# Patient Record
Sex: MALE | Race: WHITE
[De-identification: names, ages, dates, MRNs, and addresses within clinical notes are randomized per-mention and may not be internally consistent; named-entity substitution may affect disease eponyms.]

---

## 2020-08-09 ENCOUNTER — HOSPITAL ENCOUNTER (INPATIENT)
Dept: HOSPITAL 47 - EC | Age: 50
LOS: 4 days | Discharge: HOME | DRG: 885 | End: 2020-08-13
Attending: PSYCHIATRY & NEUROLOGY | Admitting: PSYCHIATRY & NEUROLOGY
Payer: COMMERCIAL

## 2020-08-09 DIAGNOSIS — F17.210: ICD-10-CM

## 2020-08-09 DIAGNOSIS — F23: ICD-10-CM

## 2020-08-09 DIAGNOSIS — F19.10: ICD-10-CM

## 2020-08-09 DIAGNOSIS — R45.851: ICD-10-CM

## 2020-08-09 DIAGNOSIS — E66.3: ICD-10-CM

## 2020-08-09 DIAGNOSIS — F31.9: Primary | ICD-10-CM

## 2020-08-09 DIAGNOSIS — G47.00: ICD-10-CM

## 2020-08-09 DIAGNOSIS — F41.9: ICD-10-CM

## 2020-08-09 DIAGNOSIS — Z79.899: ICD-10-CM

## 2020-08-09 DIAGNOSIS — Z65.3: ICD-10-CM

## 2020-08-09 DIAGNOSIS — Z59.9: ICD-10-CM

## 2020-08-09 DIAGNOSIS — F10.239: ICD-10-CM

## 2020-08-09 DIAGNOSIS — Z56.0: ICD-10-CM

## 2020-08-09 DIAGNOSIS — Z81.8: ICD-10-CM

## 2020-08-09 PROCEDURE — 80320 DRUG SCREEN QUANTALCOHOLS: CPT

## 2020-08-09 PROCEDURE — 99285 EMERGENCY DEPT VISIT HI MDM: CPT

## 2020-08-09 PROCEDURE — 80306 DRUG TEST PRSMV INSTRMNT: CPT

## 2020-08-09 PROCEDURE — 84443 ASSAY THYROID STIM HORMONE: CPT

## 2020-08-09 PROCEDURE — 36415 COLL VENOUS BLD VENIPUNCTURE: CPT

## 2020-08-09 PROCEDURE — 83036 HEMOGLOBIN GLYCOSYLATED A1C: CPT

## 2020-08-09 PROCEDURE — 80053 COMPREHEN METABOLIC PANEL: CPT

## 2020-08-09 PROCEDURE — 83520 IMMUNOASSAY QUANT NOS NONAB: CPT

## 2020-08-09 PROCEDURE — 82075 ASSAY OF BREATH ETHANOL: CPT

## 2020-08-09 PROCEDURE — 80329 ANALGESICS NON-OPIOID 1 OR 2: CPT

## 2020-08-09 PROCEDURE — 80061 LIPID PANEL: CPT

## 2020-08-09 PROCEDURE — 85025 COMPLETE CBC W/AUTO DIFF WBC: CPT

## 2020-08-09 SDOH — ECONOMIC STABILITY - INCOME SECURITY: PROBLEM RELATED TO HOUSING AND ECONOMIC CIRCUMSTANCES, UNSPECIFIED: Z59.9

## 2020-08-09 SDOH — ECONOMIC STABILITY - INCOME SECURITY: UNEMPLOYMENT, UNSPECIFIED: Z56.0

## 2020-08-09 NOTE — ED
Psych HPI





- General


Chief Complaint: Psychiatric Symptoms


Stated Complaint: Mental Health


Source: patient


Mode of arrival: ambulatory





- History of Present Illness


Initial Comments: 


Flako a 50-year-old male who reports a history of bipolar.  Patient reports that

due to some changes in his life he no longer has primary care or psychiatric 

care states he ran out of his medications 2 weeks ago and has been unmedicated. 

Patient states that he feels that he is in a manic state.  He states his 

thoughts are racing and he hasn't slept for 6 days.  Patient does admit to 

drinking alcohol in an attempt to self medicate.  Patient states he will 

hospital today because he knows he needs to be back on his medications.








- Related Data


                                    Allergies











Allergy/AdvReac Type Severity Reaction Status Date / Time


 


No Known Allergies Allergy   Verified 08/09/20 21:31














Review of Systems


ROS Statement: 


Those systems with pertinent positive or pertinent negative responses have been 

documented in the HPI.





ROS Other: All systems not noted in ROS Statement are negative.





Past Medical History


Past Medical History: No Reported History


History of Any Multi-Drug Resistant Organisms: None Reported


Past Surgical History: No Surgical Hx Reported


Past Psychological History: Anxiety, Depression


Smoking Status: Current every day smoker


Past Alcohol Use History: Abuse, Daily





General Exam





- General Exam Comments


Initial Comments: 


Physical Exam


GENERAL:


Patient is well-developed and well-nourished.  


Patient is nontoxic and well-hydrated and is in no distress.





HENT:


Normocephalic, Atraumatic. 





EYES:


PERRL, EOMI





PULMONARY:


Unlabored respirations.  





CARDIOVASCULAR:


RRR


Warm and well perfused extremities





ABDOMEN:


Non-distended





SKIN:


No rashes or bruising 





: 


Deferred





NEUROLOGIC:


Alert and oriented


Normal speech


Normal gait





MUSCULOSKELETAL:


Moving all extremities with no apparent injury 





PSYCHIATRIC:


Acute sara, no suicidal or homicidal ideations





Limitations: no limitations





Course


                                   Vital Signs











  08/09/20 08/10/20





  21:31 06:02


 


Temperature 98.9 F 


 


Pulse Rate 76 84


 


Respiratory 16 17





Rate  


 


Blood Pressure 116/78 142/80


 


O2 Sat by Pulse 96 98





Oximetry  














Medical Decision Making





- Medical Decision Making


Patient was seen and evaluated patient was slightly intoxicated but admitted to 

having a manic episode


Upon sobriety patient was evaluated by the emergency psychiatric services nurse 

who agrees the patient requires inpatient admission as he will not safety plan.


Labs were ordered for medical clearance


Patient be transferred to psychiatric facility








- Lab Data


Result diagrams: 


                                 08/10/20 02:47





                                 08/10/20 02:47


                                   Lab Results











  08/10/20 08/10/20 08/10/20 Range/Units





  00:04 02:47 02:47 


 


WBC   5.8   (3.8-10.6)  k/uL


 


RBC   4.69   (4.30-5.90)  m/uL


 


Hgb   14.9   (13.0-17.5)  gm/dL


 


Hct   45.4   (39.0-53.0)  %


 


MCV   96.7   (80.0-100.0)  fL


 


MCH   31.7   (25.0-35.0)  pg


 


MCHC   32.7   (31.0-37.0)  g/dL


 


RDW   13.2   (11.5-15.5)  %


 


Plt Count   291   (150-450)  k/uL


 


Neutrophils %   49   %


 


Lymphocytes %   38   %


 


Monocytes %   5   %


 


Eosinophils %   5   %


 


Basophils %   2   %


 


Neutrophils #   2.8   (1.3-7.7)  k/uL


 


Lymphocytes #   2.2   (1.0-4.8)  k/uL


 


Monocytes #   0.3   (0-1.0)  k/uL


 


Eosinophils #   0.3   (0-0.7)  k/uL


 


Basophils #   0.1   (0-0.2)  k/uL


 


Sodium    133 L  (137-145)  mmol/L


 


Potassium    3.8  (3.5-5.1)  mmol/L


 


Chloride    102  ()  mmol/L


 


Carbon Dioxide    21 L  (22-30)  mmol/L


 


Anion Gap    10  mmol/L


 


BUN    17  (9-20)  mg/dL


 


Creatinine    1.05  (0.66-1.25)  mg/dL


 


Est GFR (CKD-EPI)AfAm    >90  (>60 ml/min/1.73 sqM)  


 


Est GFR (CKD-EPI)NonAf    84  (>60 ml/min/1.73 sqM)  


 


Glucose    141 H  (74-99)  mg/dL


 


Calcium    9.0  (8.4-10.2)  mg/dL


 


Total Bilirubin    0.6  (0.2-1.3)  mg/dL


 


AST    117 H  (17-59)  U/L


 


ALT    162 H  (4-49)  U/L


 


Alkaline Phosphatase    111  ()  U/L


 


Total Protein    7.4  (6.3-8.2)  g/dL


 


Albumin    4.6  (3.5-5.0)  g/dL


 


Salicylates    <1.0  mg/dL


 


Urine Opiates Screen  Not Detected    (NotDetected)  


 


Ur Oxycodone Screen  Not Detected    (NotDetected)  


 


Urine Methadone Screen  Not Detected    (NotDetected)  


 


Ur Propoxyphene Screen  Not Detected    (NotDetected)  


 


Acetaminophen    <10.0  ug/mL


 


Ur Barbiturates Screen  Not Detected    (NotDetected)  


 


U Tricyclic Antidepress  Not Detected    (NotDetected)  


 


Ur Phencyclidine Scrn  Not Detected    (NotDetected)  


 


Ur Amphetamines Screen  Not Detected    (NotDetected)  


 


U Methamphetamines Scrn  Not Detected    (NotDetected)  


 


U Benzodiazepines Scrn  Not Detected    (NotDetected)  


 


Urine Cocaine Screen  Not Detected    (NotDetected)  


 


U Marijuana (THC) Screen  Not Detected    (NotDetected)  


 


Serum Alcohol    <10  mg/dL














Disposition


Clinical Impression: 


 Acute psychosis, Bipolar disorder





Disposition: TRANSFER TO PSYCH HOSP/UNIT


Condition: Serious


Is patient prescribed a controlled substance at d/c from ED?: No


Referrals: 


None,Stated [Primary Care Provider] - 1-2 days

## 2020-08-10 LAB
ALBUMIN SERPL-MCNC: 4.6 G/DL (ref 3.5–5)
ALP SERPL-CCNC: 111 U/L (ref 38–126)
ALT SERPL-CCNC: 162 U/L (ref 4–49)
ANION GAP SERPL CALC-SCNC: 10 MMOL/L
APAP SPEC-MCNC: <10 UG/ML
AST SERPL-CCNC: 117 U/L (ref 17–59)
BASOPHILS # BLD AUTO: 0.1 K/UL (ref 0–0.2)
BASOPHILS NFR BLD AUTO: 2 %
BUN SERPL-SCNC: 17 MG/DL (ref 9–20)
CALCIUM SPEC-MCNC: 9 MG/DL (ref 8.4–10.2)
CHLORIDE SERPL-SCNC: 102 MMOL/L (ref 98–107)
CO2 SERPL-SCNC: 21 MMOL/L (ref 22–30)
EOSINOPHIL # BLD AUTO: 0.3 K/UL (ref 0–0.7)
EOSINOPHIL NFR BLD AUTO: 5 %
ERYTHROCYTE [DISTWIDTH] IN BLOOD BY AUTOMATED COUNT: 4.69 M/UL (ref 4.3–5.9)
ERYTHROCYTE [DISTWIDTH] IN BLOOD: 13.2 % (ref 11.5–15.5)
GLUCOSE SERPL-MCNC: 141 MG/DL (ref 74–99)
HCT VFR BLD AUTO: 45.4 % (ref 39–53)
HGB BLD-MCNC: 14.9 GM/DL (ref 13–17.5)
LYMPHOCYTES # SPEC AUTO: 2.2 K/UL (ref 1–4.8)
LYMPHOCYTES NFR SPEC AUTO: 38 %
MCH RBC QN AUTO: 31.7 PG (ref 25–35)
MCHC RBC AUTO-ENTMCNC: 32.7 G/DL (ref 31–37)
MCV RBC AUTO: 96.7 FL (ref 80–100)
MONOCYTES # BLD AUTO: 0.3 K/UL (ref 0–1)
MONOCYTES NFR BLD AUTO: 5 %
NEUTROPHILS # BLD AUTO: 2.8 K/UL (ref 1.3–7.7)
NEUTROPHILS NFR BLD AUTO: 49 %
PLATELET # BLD AUTO: 291 K/UL (ref 150–450)
POTASSIUM SERPL-SCNC: 3.8 MMOL/L (ref 3.5–5.1)
PROT SERPL-MCNC: 7.4 G/DL (ref 6.3–8.2)
SALICYLATES SERPL-MCNC: <1 MG/DL
SODIUM SERPL-SCNC: 133 MMOL/L (ref 137–145)
WBC # BLD AUTO: 5.8 K/UL (ref 3.8–10.6)

## 2020-08-10 RX ADMIN — NICOTINE SCH: 14 PATCH, EXTENDED RELEASE TRANSDERMAL at 20:25

## 2020-08-10 RX ADMIN — ESCITALOPRAM OXALATE SCH: 10 TABLET, FILM COATED ORAL at 20:25

## 2020-08-10 NOTE — P.HPMEDMHU
History of Present Illness


H&P Date: 08/10/20


Chief Complaint: Medical Co-management





50-year-old overweight man with past medical history of polysubstance abuse, 

bipolar disorder presented with depression, anxiety.  He was admitted to the 

mental health unit for titration of medication; medicine was consulted by 

psychiatry for medical management.  Patient's review of systems was positive for

anxiety/depression.  Negative for fevers, chills, nausea, vomiting, chest pain, 

palpitations, significant, shortness of breath, abdominal pain, dysuria, 

dyschezia, nocturia, hematochezia, melena, numbness/weakness, visual changes, 

hearing changes.





Patient is hemodynamically stable, labs were reviewed and were unremarkable ex

cept for mild hyponatremia.





Review of Systems





All Systems reviewed and pertinent positives and negatives noted in HPI, all 

other symptoms are negative





Past Medical History


Past Medical History: No Reported History


History of Any Multi-Drug Resistant Organisms: None Reported


Past Surgical History: No Surgical Hx Reported


Past Psychological History: Anxiety, Depression


Smoking Status: Current every day smoker


Past Alcohol Use History: Abuse, Daily





Medications and Allergies


                                Home Medications











 Medication  Instructions  Recorded  Confirmed  Type


 


Escitalopram [Lexapro] 10 mg PO DAILY 08/10/20 08/10/20 History


 


Lurasidone [Latuda] 40 mg PO DAILY 08/10/20 08/10/20 History


 


OLANZapine [ZyPREXA] 5 mg PO HS 08/10/20 08/10/20 History


 


buPROPion HCL [Wellbutrin XL] 300 mg PO DAILY 08/10/20 08/10/20 History








                                    Allergies











Allergy/AdvReac Type Severity Reaction Status Date / Time


 


No Known Allergies Allergy   Verified 08/10/20 11:03














Physical Exam


Osteopathic Statement: *.  No significant issues noted on an osteopathic 

structural exam other than those noted in the History and Physical/Consult.


Vitals: 


                                   Vital Signs











  Temp Pulse Resp BP BP Pulse Ox


 


 08/10/20 15:11  97.7 F   14   132/102  94 L


 


 08/10/20 06:02   84  17  142/80   98


 


 08/09/20 21:31  98.9 F  76  16  116/78   96














Gen: awake, alert


HEENT: normocephalic, atraumatic, good hearing acuity, moist mucous membranes


Resp: CTAB, good air exchange, no accessory muscle use, no wheezes, crackles, 

rhonchi


CVS: good distal perfusion x 4, RRR, no murmurs, clicks, gallops


GI: soft, NTTP, ND


: no SPT, no CVAT, bruno catheter [IS/NOT] present


MSK: no pitting edema, no clubbing


Neuro: non-focal, no sensory deficits, appropriate tone


Psych: cooperative, euthymic mood





Cranial Nerve Examination





- Cranial Nerves


Cranial Nerve II- Optic: Intact


Cranial Nerve III- Oculomotor: Intact


Cranial Nerve IV- Trochlear: Intact


Cranial Nerve V- Trigeminal: Intact


Cranial Nerve VI- Abducens: Intact


Cranial Nerve VII- Facial: Intact


Cranial Nerve VIII- Auditory: Intact


Cranial Nerve IX- Glossopharyngeal: Intact


Cranial Nerve X- Vagus: Intact


Cranial Nerve XI- Accessory: Intact


Cranial Nerve XII- Hypoglossal: Intact





Results


CBC & Chem 7: 


                                 08/10/20 02:47





                                 08/10/20 02:47


Labs: 


                  Abnormal Lab Results - Last 24 Hours (Table)











  08/10/20 Range/Units





  02:47 


 


Sodium  133 L  (137-145)  mmol/L


 


Carbon Dioxide  21 L  (22-30)  mmol/L


 


Glucose  141 H  (74-99)  mg/dL


 


AST  117 H  (17-59)  U/L


 


ALT  162 H  (4-49)  U/L














Assessment and Plan


Assessment: 





1.  Polysubstance abuse


2.  Overweight


3.  Bipolar disorder with sara


4.  Alcohol withdrawal syndrome


5.  Alcohol abuse disorder





50-year-old overweight man with past medical history of alcohol abuse disorder, 

polysubstance abuse presents for medication titration for bipolar disorder; 

medicine consulted for medical management.





Plan:


thiamine, folate, MVI


ativan PRN for withdrawal per [facility] oversight


recommend weight loss planning


nicotine patch PRN


Rest of care/counseling per psychiatry/[facility]





Thank you for allowing us to participate in this patient's care, please call 

consulting provider if further questions/concerns.

## 2020-08-11 LAB
CHOLEST SERPL-MCNC: 288 MG/DL (ref ?–200)
HBA1C MFR BLD: 5.5 % (ref 4–6)
HDLC SERPL-MCNC: 80 MG/DL (ref 40–60)
LDLC SERPL CALC-MCNC: 182 MG/DL (ref 0–99)
TRIGL SERPL-MCNC: 130 MG/DL (ref ?–150)

## 2020-08-11 RX ADMIN — Medication SCH MG: at 08:08

## 2020-08-11 RX ADMIN — THERA TABS SCH EACH: TAB at 08:08

## 2020-08-11 RX ADMIN — ESCITALOPRAM OXALATE SCH MG: 10 TABLET, FILM COATED ORAL at 08:08

## 2020-08-11 RX ADMIN — NICOTINE SCH: 14 PATCH, EXTENDED RELEASE TRANSDERMAL at 08:08

## 2020-08-11 RX ADMIN — FOLIC ACID SCH MG: 1 TABLET ORAL at 08:08

## 2020-08-11 NOTE — P.HP
Psychiatric H&P





- .


H&P Date: 08/11/20


History & Physical: 


                                    Allergies











Allergy/AdvReac Type Severity Reaction Status Date / Time


 


No Known Allergies Allergy   Verified 08/10/20 11:03








                                   Vital Signs











Temp  98.3 F   08/11/20 06:27


 


Pulse  75   08/11/20 06:27


 


Resp  16   08/11/20 06:27


 


BP  150/66   08/11/20 06:27


 


Pulse Ox  94 L  08/10/20 15:11








                             Laboratory Last Values











WBC  5.8 k/uL (3.8-10.6)   08/10/20  02:47    


 


RBC  4.69 m/uL (4.30-5.90)   08/10/20  02:47    


 


Hgb  14.9 gm/dL (13.0-17.5)   08/10/20  02:47    


 


Hct  45.4 % (39.0-53.0)   08/10/20  02:47    


 


MCV  96.7 fL (80.0-100.0)   08/10/20  02:47    


 


MCH  31.7 pg (25.0-35.0)   08/10/20  02:47    


 


MCHC  32.7 g/dL (31.0-37.0)   08/10/20  02:47    


 


RDW  13.2 % (11.5-15.5)   08/10/20  02:47    


 


Plt Count  291 k/uL (150-450)   08/10/20  02:47    


 


Neutrophils %  49 %  08/10/20  02:47    


 


Lymphocytes %  38 %  08/10/20  02:47    


 


Monocytes %  5 %  08/10/20  02:47    


 


Eosinophils %  5 %  08/10/20  02:47    


 


Basophils %  2 %  08/10/20  02:47    


 


Neutrophils #  2.8 k/uL (1.3-7.7)   08/10/20  02:47    


 


Lymphocytes #  2.2 k/uL (1.0-4.8)   08/10/20  02:47    


 


Monocytes #  0.3 k/uL (0-1.0)   08/10/20  02:47    


 


Eosinophils #  0.3 k/uL (0-0.7)   08/10/20  02:47    


 


Basophils #  0.1 k/uL (0-0.2)   08/10/20  02:47    


 


Sodium  133 mmol/L (137-145)  L  08/10/20  02:47    


 


Potassium  3.8 mmol/L (3.5-5.1)   08/10/20  02:47    


 


Chloride  102 mmol/L ()   08/10/20  02:47    


 


Carbon Dioxide  21 mmol/L (22-30)  L  08/10/20  02:47    


 


Anion Gap  10 mmol/L  08/10/20  02:47    


 


BUN  17 mg/dL (9-20)   08/10/20  02:47    


 


Creatinine  1.05 mg/dL (0.66-1.25)   08/10/20  02:47    


 


Est GFR (CKD-EPI)AfAm  >90  (>60 ml/min/1.73 sqM)   08/10/20  02:47    


 


Est GFR (CKD-EPI)NonAf  84  (>60 ml/min/1.73 sqM)   08/10/20  02:47    


 


Glucose  141 mg/dL (74-99)  H  08/10/20  02:47    


 


Calcium  9.0 mg/dL (8.4-10.2)   08/10/20  02:47    


 


Total Bilirubin  0.6 mg/dL (0.2-1.3)   08/10/20  02:47    


 


AST  117 U/L (17-59)  H  08/10/20  02:47    


 


ALT  162 U/L (4-49)  H  08/10/20  02:47    


 


Alkaline Phosphatase  111 U/L ()   08/10/20  02:47    


 


Total Protein  7.4 g/dL (6.3-8.2)   08/10/20  02:47    


 


Albumin  4.6 g/dL (3.5-5.0)   08/10/20  02:47    


 


Triglycerides  130 mg/dL (<150)   08/10/20  02:47    


 


Cholesterol  288 mg/dL (<200)  H  08/10/20  02:47    


 


LDL Cholesterol, Calc  182 mg/dL (0-99)  H  08/10/20  02:47    


 


HDL Cholesterol  80 mg/dL (40-60)  H  08/10/20  02:47    


 


TSH  1.560 mIU/L (0.465-4.680)   08/10/20  02:47    


 


Salicylates  <1.0 mg/dL  08/10/20  02:47    


 


Urine Opiates Screen  Not Detected  (NotDetected)   08/10/20  00:04    


 


Ur Oxycodone Screen  Not Detected  (NotDetected)   08/10/20  00:04    


 


Urine Methadone Screen  Not Detected  (NotDetected)   08/10/20  00:04    


 


Ur Propoxyphene Screen  Not Detected  (NotDetected)   08/10/20  00:04    


 


Acetaminophen  <10.0 ug/mL  08/10/20  02:47    


 


Ur Barbiturates Screen  Not Detected  (NotDetected)   08/10/20  00:04    


 


U Tricyclic Antidepress  Not Detected  (NotDetected)   08/10/20  00:04    


 


Ur Phencyclidine Scrn  Not Detected  (NotDetected)   08/10/20  00:04    


 


Ur Amphetamines Screen  Not Detected  (NotDetected)   08/10/20  00:04    


 


U Methamphetamines Scrn  Not Detected  (NotDetected)   08/10/20  00:04    


 


U Benzodiazepines Scrn  Not Detected  (NotDetected)   08/10/20  00:04    


 


Urine Cocaine Screen  Not Detected  (NotDetected)   08/10/20  00:04    


 


U Marijuana (THC) Screen  Not Detected  (NotDetected)   08/10/20  00:04    


 


Serum Alcohol  <10 mg/dL  08/10/20  02:47    











08/11/20 11:03


IDENTIFYING DATA: Patient is a 50-year-old  male who currently lives 

with his parents in a house is single has no kids and is unemployed.





HPI: Patient presented to the hospital initially with complaints of being in a 

"manic state" according to ER report.  Patient had complained that he had been 

feeling depressed and is having changes in his life and has been off of his 

medications as he had "ran out" for the past 2 weeks. patient apparently 

apparently up reported that he was feeling suicidal in the ER. He reportedly was

having racing thoughts poor sleep for the past 6 days.  Patient also reported 

having an increase in his drinking lately and had increase in his LFTs, sodium 

was 133 and UDS was negative on initial labs.  Patient was seen today by writer 

and appeared to be calm and directable during the interview.  He states that his

"life is all messed up".  He claims that he has been dealing with several legal 

problems and owing money to people and [employer] card companies recently and states

that he has been using his detention savings account to pay them.  He states 

that he has had 3 DUIs in the past 3 months and states that the legal charges 

are still pending.  He claims that within the past 4 months he has been drinking

more heavily about 8-10 beers per day.  He states that "this alcohol thing is 

new for me I'm not normally an alcoholic".  He states that his sleep is "okay" 

and denied any withdrawal symptoms at this time.  He claims that he was 

previously in rehab at [place] 3 weeks ago and completed the program 

however states that when he was released "I had no interest in quitting 

radha" and relapsed. he states that his mood now is depressed. Patient denies 

any suicidal or homicidal ideations intent or plan.  At this time patient denies

any auditory or visual hallucinations. Patient admits to using alcohol daily as 

listed above.  He denies any other recreational drug useand admits using 

cigarettes daily.





PAST PSYCHIATRIC HISTORY: Patient states that he has a history of bipolar 

depression. patient was previously on olanzapine and Lexapro however ran out for

the past 2 weeks. he states that he has been hospitalized approximately 6 times 

in the past 4 times in [place] and once at [place] and the last admission was 

at [facility] 3 months ago. [Patient denies any psychiatric outpatient follow-

up.] [Patient denies any history of suicide attempts in the past.]





PMH:denies





ALLERGIES: [as per EMR]





CHEMICAL DEPENDENCY HISTORY: [as per HPI]





FAMILY PSYCHIATRIC/SUBSTANCE USE HISTORY:  states that his aunt has bipolar 

disorder.





SOCIAL HISTORY: Patient was born and raised in the [facility] and claims 

that he has a bachelor's degree in zoology.  He states that he completed high 

school.  He claims that he worked as an  for several years however 

now is unemployed.  He currently lives with his parents is single and has no 

kids.





MENTAL STATUS EXAM: 


General Appearance: Patient appears to be stated age is alert, directable, and 

attempts to cooperate. Patient appears to have poor hygiene and grooming.


Behavior: Patient is seated without any agitated behavior. attempts to 

cooperate.  Irritable at times.


Speech: Patient's speech is [fluent and nonpressured.]


Mood/Affect: Patient reports their mood is depressed, affect is congruent and 

constricted. 


Suicidality/Homicidality:  Patient denies having any homicidal ideation intent 

or plan. Denies any suicidal ideations intent or plan  


Perceptions: Patient denies any visual hallucinations [and denies any auditory 

hallucinations]


Though content/process: There is no evidence of any delusional thought content 

and thought process is linear and goal-directed. 


Memory and concentration: AOX3, grossly intact for the purposes of this session.

Can spell "WORLD" backwards


Judgment and insight: poor





STRENGTHS/WEAKNESSES: strength is that patient is resilient. Weakness is that 

patient has poor judgment and is impulsive





INTELLECT: average





IMPRESSIONS: 


bipolar disorder, currently depressed


Alcohol use disorder, currently in withdrawal


Nicotine dependence





PLAN: 


-Patient is admitted under voluntary status to [facility] for stabilization of 

psychiatric symptoms and safety. Patient signed adult voluntary form and 

medication consent and is placed in patient's chart.


-Medications : Will start patient on Zyprexa 5 mg daily at bedtime for mood 

stabilization/insomnia.  Patient is also agreeable to start Prozac 20 mg daily 

for mood


-Ativan and Geodon PRN for agitation/aggression


-Started thiamine, MVM for etoh use


-CIWA protocol with Ativan PRN for ETOH withdrawal


-Patient was counselled on substance abuse and desired to cut back on use


-Patient was informed of the risks, benefits and side effects of the medication 

and patient verbally consented to taking the medications. Patient signed med 

consent form and was placed in chart.


-Internal Medicine consult to perform medical evaluation and physical.


-NRT - nicotine patch


-SW on board for discharge planning. Encourage patient to participate in groups 

to work on coping skills. patient claims that he does not want to go to rehab 

upon discharge and would rather be discharged to a sober living house.





08/11/20 11:28

## 2020-08-12 VITALS — RESPIRATION RATE: 16 BRPM

## 2020-08-12 RX ADMIN — FOLIC ACID SCH MG: 1 TABLET ORAL at 09:19

## 2020-08-12 RX ADMIN — NICOTINE SCH: 14 PATCH, EXTENDED RELEASE TRANSDERMAL at 09:19

## 2020-08-12 RX ADMIN — Medication SCH MG: at 09:19

## 2020-08-12 RX ADMIN — THERA TABS SCH EACH: TAB at 09:19

## 2020-08-12 NOTE — P.PN
Progress Note - Text


Progress Note Date: 08/12/20





Interval History:


Patient was seen wandering the hallways and was directable and agreeable to winsome ramos with writer in the office.  Patient appeared to have mild improvement in his

hygiene and grooming today.  He also appeared to have good improvement in his 

affect and states that he is doing "a bit better".  He claims that he feels the 

medication has been helping him with sleep and states that he was able to sleep 

approximately 7-8 hours last night.  He states that he does feel tired this 

morning and was yawning at times during the interview.  He claims that he has 

been working on "meditation in my room" and has been going to some groups.  He 

claims that he still wants to go to a sober living house and wants to have that 

arranged.  He denied any changes in his appetite. At this time patient denies 

any suicidal or homical ideations, intent or plan. Patient denies any auditory, 

visual hallucinations and denies any paranoia or delusions. Patient denies any 

side effects from the medications and has been compliant with meds. 





Mental Status Exam:


General Appearance: Patient appears to be stated age is alert, directable, and 

attempts to cooperate. Patient appears to have improving hygiene and grooming.


Behavior: Patient is seated without any agitated behavior. attempts to 

cooperate.  Less irritable today.


Speech: Patient's speech is fluent and nonpressured.


Mood/Affect: Patient reports their mood is depressed, affect is congruent and 

constricted. 


Suicidality/Homicidality:  Patient denies having any homicidal ideation intent 

or plan. Denies any suicidal ideations intent or plan  


Perceptions: Patient denies any visual hallucinations and denies any auditory 

hallucinations


Though content/process: There is no evidence of any delusional thought content 

and thought process is linear and goal-directed. 


Memory and concentration: AOX3, grossly intact for the purposes of this session.




Judgment and insight: Improving mildly





Assessment


bipolar disorder, currently depressed


Alcohol use disorder, currently in withdrawal


Nicotine dependence





Plan:


-Patient continues to meet criteria for inpatient psychiatric admission for 

symptom stabilization and safety. Patient has signed adult voluntary form and 

medication consent and was placed in patient's chart.


-Medications: Continue with Zyprexa 5 mg nightly for mood 

stabilization/insomnia, increased Prozac to 40 mg daily for mood.


-Continue with thiamine, MVM for etoh use


-CIWA protocol with Ativan PRN for ETOH withdrawal.  Vital signs reviewed.


-When necessary Ativan and Geodon for agitation/aggression.


-NRT - nicotine patch


-SW on board for discharge planning.  Encouraged the patient to participate in 

milieu.  Patient will likely be discharged back to his parents house to gather 

his belongings and today social work to help make arrangements for sober living 

house.  Likely discharge tomorrow.

## 2020-08-13 VITALS — DIASTOLIC BLOOD PRESSURE: 77 MMHG | SYSTOLIC BLOOD PRESSURE: 123 MMHG | HEART RATE: 76 BPM | TEMPERATURE: 98.6 F

## 2020-08-13 LAB
ALBUMIN SERPL-MCNC: 4.2 G/DL (ref 3.5–5)
ALP SERPL-CCNC: 98 U/L (ref 38–126)
ALT SERPL-CCNC: 148 U/L (ref 4–49)
ANION GAP SERPL CALC-SCNC: 6 MMOL/L
AST SERPL-CCNC: 111 U/L (ref 17–59)
BUN SERPL-SCNC: 14 MG/DL (ref 9–20)
CALCIUM SPEC-MCNC: 9.1 MG/DL (ref 8.4–10.2)
CHLORIDE SERPL-SCNC: 107 MMOL/L (ref 98–107)
CO2 SERPL-SCNC: 24 MMOL/L (ref 22–30)
GLUCOSE SERPL-MCNC: 151 MG/DL (ref 74–99)
POTASSIUM SERPL-SCNC: 4.2 MMOL/L (ref 3.5–5.1)
PROT SERPL-MCNC: 6.8 G/DL (ref 6.3–8.2)
SODIUM SERPL-SCNC: 137 MMOL/L (ref 137–145)

## 2020-08-13 RX ADMIN — Medication SCH MG: at 09:00

## 2020-08-13 RX ADMIN — FOLIC ACID SCH MG: 1 TABLET ORAL at 09:00

## 2020-08-13 RX ADMIN — NICOTINE SCH: 14 PATCH, EXTENDED RELEASE TRANSDERMAL at 09:00

## 2020-08-13 RX ADMIN — THERA TABS SCH EACH: TAB at 09:01

## 2020-08-13 NOTE — P.DS
Providers


Date of admission: 


08/10/20 14:00





Expected date of discharge: 08/13/20


Attending physician: 


Malcom Melvin MD





Consults: 





                                        





08/10/20 14:17


Consult Physician Routine 


   Consulting Provider: [facility]


   Consult Reason/Comments: H&P and medical


   Do you want consulting provider notified?: Yes











Primary care physician: 


Stated None








- Discharge Diagnosis(es)


(1) Bipolar disorder current episode depressed


Current Visit: Yes   Status: Acute   Priority: High   





(2) Alcohol use disorder, moderate, dependence


Current Visit: Yes   Status: Acute   Priority: Medium   





(3) Nicotine dependence


Current Visit: Yes   Status: Acute   Priority: Low   


Hospital Course: 





Admission HPI:


Patient is a 50-year-old  male who currently lives with his parents in 

a house is single has no kids and is unemployed. Patient presented to the 

hospital initially with complaints of being in a "manic state" according to ER 

report.  Patient had complained that he had been feeling depressed and is having

changes in his life and has been off of his medications as he had "ran out" for 

the past 2 weeks. patient apparently apparently up reported that he was feeling 

suicidal in the ER. He reportedly was having racing thoughts poor sleep for the 

past 6 days.  Patient also reported having an increase in his drinking lately 

and had increase in his LFTs, sodium was 133 and UDS was negative on initial 

labs.  Patient was seen today by writer and appeared to be calm and directable 

during the interview.  He states that his "life is all messed up".  He claims 

that he has been dealing with several legal problems and owing money to people 

and credit card companies recently and states that he has been using his 

residential savings account to pay them.  He states that he has had 3 DUIs in the

past 3 months and states that the legal charges are still pending.  He claims 

that within the past 4 months he has been drinking more heavily about 8-10 beers

per day.  He states that "this alcohol thing is new for me I'm not normally an 

alcoholic".  He states that his sleep is "okay" and denied any withdrawal 

symptoms at this time.  He claims that he was previously in rehab at [place] 3 weeks ago and completed the program however states that when he was 

released "I had no interest in quitting drinking" and relapsed. he states that 

his mood now is depressed. Patient denies any suicidal or homicidal ideations 

intent or plan.  At this time patient denies any auditory or visual 

hallucinations. Patient admits to using alcohol daily as listed above.  He 

denies any other recreational drug useand admits using cigarettes daily.





Hospital course:


Upon admission to the unit patient was initially depressed and anxious and going

through alcohol withdrawal.  Patient was placed on CIWA protocol with when 

necessary Ativan and vital signs were monitored for alcohol withdrawal.  Patient

was however directable and agreeable to commence treatment. Patient got along 

well with other patients on the unit and followed unit protocol.  Patient was 

compliant with the medications and denied any side effects throughout hospital 

course.  Patient was started on Zyprexa and titrated up to a dose of 5 mg 

nightly for mood stabilization/insomnia, Prozac was also started and titrated up

to dose of 40 mg daily for mood.  Patient spoke of his stressors and engaged in 

therapy both group and individual.  Patient was also seen by medical team for 

history and physical exam.  Throughout the course of the hospitalization patient

gradually improved with regards to mood, anxiety, sleep and became future 

oriented with improved insight and judgment. On the day of discharge patient 

denied any suicidal or homicidal ideations intent or plan denied any auditory or

visual hallucinations. Patient endorsed wanting to live for his sobriety and his

future.  The patient denied any access to guns or weapons.  Patient denied any 

paranoia and did not endorse any delusions.  Patient does have a significant 

history of substance abuse and was counseled on abstaining from all substances 

including alcohol and marijuana. Patient was offered however declined inpatient 

substance-abuse rehab.  Patient was however interested in going to a sober house

and had a bed at [facility] set up for 8/14/2020 and plans to gather his 

belongings at home before going there tomorrow. Patient was also counseled on 

the medications and need for regular compliance and was encouraged to follow-up 

with their outpatient appointment for mental health and also for primary care.  

Prior to discharge a family meeting will be arranged by  to answer 

any questions and ensure safety upon discharge.





Mental status exam:


General Appearance: Patient appears to be stated age is alert, pleasant, and 

cooperative. Patient is in no acute distress and has improved hygiene and 

grooming 


Behavior: Patient is calmly seated without any agitated behavior.


Speech: Patient's speech is fluent and nonpressured. 


Mood/Affect: Patient reports their mood is "much better", affect is congruent 

and euthymic. 


Suicidality/Homicidality:  Patient denies having any suicidal or homicidal 

ideation intent or plan.  


Perceptions: Patient denies any auditory or visual hallucinations.  


Though content/process: There is no evidence of any delusional thought content 

and thought process is linear and goal-directed. more future oriented and 

focused on his sobriety


Memory and concentration: AOX3, grossly intact for the purposes of this session.

Can spell "WORLD" backwards correctly.


Judgment and insight: Improved with guarded prognosis





Impression:


Bipolar disorder, currently depressed


Alcohol use disorder


Nicotine dependence





Plan:


-Continue with discharge today as patient has improved and stabilized 

psychiatrically and is not currently an imminent threat to himself and/or 

others. Patient will remain at chronically elevated risk for harm to self and/or

others due to his impulsivity and polysubstance abuse.


-Continue medications: Continue with Zyprexa 5 mg nightly for mood 

stabilization/insomnia, Prozac 40 mg daily for mood.


-Patient was counseled on the need for medication compliance and appropriate 

follow-up at mental health and also primary care for medical issues.  Patient 

verbalized understanding and agreed.


-Social work to arrange for and conduct family meeting to ensure safety upon 

discharge and answer any questions/concerns. Social work also to arrange for 

patients follow up appointments with [facility] for psychiatric care along with follow 

up with primary care provider.


-Patient counseled on abstaining from recreational drugs and marijuana and 

alcohol. Was informed/educated on the adverse effects on their physical and 

mental health. Patient verbally agreed and understood. Patient was offered 

substance abuse treatment however declined at this time.  Patient does have a 

bed at [facility] set up for 8/14/2020 and plans to gather his 

belongings at home before going there tomorrow.


-Patient was instructed to return to the hospital or seek immediate medical care

if their psychiatric or medical symptoms do worsen or reoccur.








                                    Allergies











Allergy/AdvReac Type Severity Reaction Status Date / Time


 


No Known Allergies Allergy   Verified 08/10/20 11:03











                               Laboratory Results











WBC  5.8 k/uL (3.8-10.6)   08/10/20  02:47    


 


RBC  4.69 m/uL (4.30-5.90)   08/10/20  02:47    


 


Hgb  14.9 gm/dL (13.0-17.5)   08/10/20  02:47    


 


Hct  45.4 % (39.0-53.0)   08/10/20  02:47    


 


MCV  96.7 fL (80.0-100.0)   08/10/20  02:47    


 


MCH  31.7 pg (25.0-35.0)   08/10/20  02:47    


 


MCHC  32.7 g/dL (31.0-37.0)   08/10/20  02:47    


 


RDW  13.2 % (11.5-15.5)   08/10/20  02:47    


 


Plt Count  291 k/uL (150-450)   08/10/20  02:47    


 


Neutrophils %  49 %  08/10/20  02:47    


 


Lymphocytes %  38 %  08/10/20  02:47    


 


Monocytes %  5 %  08/10/20  02:47    


 


Eosinophils %  5 %  08/10/20  02:47    


 


Basophils %  2 %  08/10/20  02:47    


 


Neutrophils #  2.8 k/uL (1.3-7.7)   08/10/20  02:47    


 


Lymphocytes #  2.2 k/uL (1.0-4.8)   08/10/20  02:47    


 


Monocytes #  0.3 k/uL (0-1.0)   08/10/20  02:47    


 


Eosinophils #  0.3 k/uL (0-0.7)   08/10/20  02:47    


 


Basophils #  0.1 k/uL (0-0.2)   08/10/20  02:47    


 


Sodium  133 mmol/L (137-145)  L  08/10/20  02:47    


 


Potassium  3.8 mmol/L (3.5-5.1)   08/10/20  02:47    


 


Chloride  102 mmol/L ()   08/10/20  02:47    


 


Carbon Dioxide  21 mmol/L (22-30)  L  08/10/20  02:47    


 


Anion Gap  10 mmol/L  08/10/20  02:47    


 


BUN  17 mg/dL (9-20)   08/10/20  02:47    


 


Creatinine  1.05 mg/dL (0.66-1.25)   08/10/20  02:47    


 


Est GFR (CKD-EPI)AfAm  >90  (>60 ml/min/1.73 sqM)   08/10/20  02:47    


 


Est GFR (CKD-EPI)NonAf  84  (>60 ml/min/1.73 sqM)   08/10/20  02:47    


 


Glucose  141 mg/dL (74-99)  H  08/10/20  02:47    


 


Estimated Ave Glu mg/dL  111   08/10/20  02:47    


 


Hemoglobin A1c  5.5 % (4.0-6.0)   08/10/20  02:47    


 


Calcium  9.0 mg/dL (8.4-10.2)   08/10/20  02:47    


 


Total Bilirubin  0.6 mg/dL (0.2-1.3)   08/10/20  02:47    


 


AST  117 U/L (17-59)  H  08/10/20  02:47    


 


ALT  162 U/L (4-49)  H  08/10/20  02:47    


 


Alkaline Phosphatase  111 U/L ()   08/10/20  02:47    


 


Total Protein  7.4 g/dL (6.3-8.2)   08/10/20  02:47    


 


Albumin  4.6 g/dL (3.5-5.0)   08/10/20  02:47    


 


Triglycerides  130 mg/dL (<150)   08/10/20  02:47    


 


Cholesterol  288 mg/dL (<200)  H  08/10/20  02:47    


 


LDL Cholesterol, Calc  182 mg/dL (0-99)  H  08/10/20  02:47    


 


HDL Cholesterol  80 mg/dL (40-60)  H  08/10/20  02:47    


 


TSH  1.560 mIU/L (0.465-4.680)   08/10/20  02:47    


 


Salicylates  <1.0 mg/dL  08/10/20  02:47    


 


Urine Opiates Screen  Not Detected  (NotDetected)   08/10/20  00:04    


 


Ur Oxycodone Screen  Not Detected  (NotDetected)   08/10/20  00:04    


 


Urine Methadone Screen  Not Detected  (NotDetected)   08/10/20  00:04    


 


Ur Propoxyphene Screen  Not Detected  (NotDetected)   08/10/20  00:04    


 


Acetaminophen  <10.0 ug/mL  08/10/20  02:47    


 


Ur Barbiturates Screen  Not Detected  (NotDetected)   08/10/20  00:04    


 


U Tricyclic Antidepress  Not Detected  (NotDetected)   08/10/20  00:04    


 


Ur Phencyclidine Scrn  Not Detected  (NotDetected)   08/10/20  00:04    


 


Ur Amphetamines Screen  Not Detected  (NotDetected)   08/10/20  00:04    


 


U Methamphetamines Scrn  Not Detected  (NotDetected)   08/10/20  00:04    


 


U Benzodiazepines Scrn  Not Detected  (NotDetected)   08/10/20  00:04    


 


Urine Cocaine Screen  Not Detected  (NotDetected)   08/10/20  00:04    


 


U Marijuana (THC) Screen  Not Detected  (NotDetected)   08/10/20  00:04    


 


Serum Alcohol  <10 mg/dL  08/10/20  02:47    











                                   Vital Signs











Temp  98.6 F   08/13/20 06:28


 


Pulse  76   08/13/20 06:28


 


Resp  16   08/13/20 06:28


 


BP  123/77   08/13/20 06:28


 


Pulse Ox  98   08/12/20 09:17











Patient Condition at Discharge: Serious





Plan - Discharge Summary


New Discharge Prescriptions: 


New


   Folic Acid 1 mg PO DAILY 30 Days  tab


   Nicotine 14Mg/24Hr Patch [Habitrol] 1 patch TRANSDERM DAILY 14 Days  patch


   Multivitamins, Thera [Multivitamin (formulary)] 1 each PO DAILY 30 Days  tab


   FLUoxetine HCL [PROzac] 40 mg PO DAILY 30 Days  cap


   Acetaminophen Tab [Tylenol] 650 mg PO Q4HR PRN  tab


     PRN Reason: Pain/Discomfort


   Thiamine [Vitamin B-1] 100 mg PO DAILY 30 Days  tab


   OLANZapine [ZyPREXA] 5 mg PO HS 30 Days  tab





Discontinued


   OLANZapine [ZyPREXA] 5 mg PO HS


   Lurasidone [Latuda] 40 mg PO DAILY


   buPROPion HCL [Wellbutrin XL] 300 mg PO DAILY


   Escitalopram [Lexapro] 10 mg PO DAILY


Discharge Medication List





Acetaminophen Tab [Tylenol] 650 mg PO Q4HR PRN  tab 08/13/20 [Rx]


FLUoxetine HCL [PROzac] 40 mg PO DAILY 30 Days  cap 08/13/20 [Rx]


Folic Acid 1 mg PO DAILY 30 Days  tab 08/13/20 [Rx]


Multivitamins, Thera [Multivitamin (formulary)] 1 each PO DAILY 30 Days  tab 

08/13/20 [Rx]


Nicotine 14Mg/24Hr Patch [Habitrol] 1 patch TRANSDERM DAILY 14 Days  patch 

08/13/20 [Rx]


OLANZapine [ZyPREXA] 5 mg PO HS 30 Days  tab 08/13/20 [Rx]


Thiamine [Vitamin B-1] 100 mg PO DAILY 30 Days  tab 08/13/20 [Rx]








Follow up Appointment(s)/Referral(s): 


None,Stated [Primary Care Provider] - 1-2 days


Activity/Diet/Wound Care/Special Instructions: 


Activity and diet as tolerated. Avoid the use of street drugs and alcohol. Take 

all medications as prescribed. When you are in need of refills on your medicat

ions please contact your medical provider and/or outpatient psychiatrist to have

this done. Please go to scheduled outpatient appointment for aftercare 

treatment. If symptoms return or become worse, call the crisis line at 

[phone] and/or go to the nearest emergency room for evaluation.


Discharge Disposition: HOME SELF-CARE

## 2020-10-16 ENCOUNTER — HOSPITAL ENCOUNTER (INPATIENT)
Dept: HOSPITAL 47 - EC | Age: 50
LOS: 5 days | Discharge: HOME | DRG: 885 | End: 2020-10-21
Payer: MEDICAID

## 2020-10-16 DIAGNOSIS — Z56.0: ICD-10-CM

## 2020-10-16 DIAGNOSIS — R45.851: ICD-10-CM

## 2020-10-16 DIAGNOSIS — F31.9: Primary | ICD-10-CM

## 2020-10-16 DIAGNOSIS — Z59.0: ICD-10-CM

## 2020-10-16 DIAGNOSIS — E78.00: ICD-10-CM

## 2020-10-16 DIAGNOSIS — F41.9: ICD-10-CM

## 2020-10-16 DIAGNOSIS — F17.200: ICD-10-CM

## 2020-10-16 DIAGNOSIS — Z79.899: ICD-10-CM

## 2020-10-16 DIAGNOSIS — G47.00: ICD-10-CM

## 2020-10-16 DIAGNOSIS — F10.10: ICD-10-CM

## 2020-10-16 PROCEDURE — 80053 COMPREHEN METABOLIC PANEL: CPT

## 2020-10-16 PROCEDURE — 83036 HEMOGLOBIN GLYCOSYLATED A1C: CPT

## 2020-10-16 PROCEDURE — 80306 DRUG TEST PRSMV INSTRMNT: CPT

## 2020-10-16 PROCEDURE — 85025 COMPLETE CBC W/AUTO DIFF WBC: CPT

## 2020-10-16 PROCEDURE — 84443 ASSAY THYROID STIM HORMONE: CPT

## 2020-10-16 PROCEDURE — 82075 ASSAY OF BREATH ETHANOL: CPT

## 2020-10-16 PROCEDURE — 80061 LIPID PANEL: CPT

## 2020-10-16 PROCEDURE — 99285 EMERGENCY DEPT VISIT HI MDM: CPT

## 2020-10-16 RX ADMIN — Medication SCH MG: at 23:35

## 2020-10-16 RX ADMIN — NICOTINE SCH: 14 PATCH, EXTENDED RELEASE TRANSDERMAL at 23:33

## 2020-10-16 SDOH — ECONOMIC STABILITY - HOUSING INSECURITY: HOMELESSNESS: Z59.0

## 2020-10-16 SDOH — ECONOMIC STABILITY - INCOME SECURITY: UNEMPLOYMENT, UNSPECIFIED: Z56.0
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
## 2020-10-16 NOTE — ED
General Adult HPI





- General


Source: patient, RN notes reviewed, old records reviewed


Mode of arrival: ambulatory


Limitations: no limitations





<Da Melchor - Last Filed: 10/16/20 14:50>





<Linda Parker - Last Filed: 10/16/20 16:28>





- General


Chief complaint: Psychiatric Symptoms


Stated complaint: EPS eval


Time Seen by Provider: 10/16/20 13:25





- History of Present Illness


Initial comments: 





This is a 50-year-old male who presents to the emergency department complaining 

of being suicidal.  Patient states he was at [facility] and he was here for alcoholism.  Patient states started 2 days.  

Patient states because of the withdrawals he is beginning to have thoughts of 

suicide and he was thinking is signing out and kill himself.  Patient states he 

was going to buy some illegal drugs an overdose.  Patient denies any physical 

complaints today. (Da Melchor)





- Related Data


                                  Previous Rx's











 Medication  Instructions  Recorded


 


Acetaminophen Tab [Tylenol] 650 mg PO Q4HR PRN  tab 08/13/20


 


FLUoxetine HCL [PROzac] 40 mg PO DAILY 30 Days  cap 08/13/20


 


Folic Acid 1 mg PO DAILY 30 Days  tab 08/13/20


 


Multivitamins, Thera [Multivitamin 1 each PO DAILY 30 Days  tab 08/13/20





(formulary)]  


 


Nicotine 14Mg/24Hr Patch [Habitrol] 1 patch TRANSDERM DAILY 14 Days 08/13/20





 patch 


 


OLANZapine [ZyPREXA] 5 mg PO HS 30 Days  tab 08/13/20


 


Thiamine [Vitamin B-1] 100 mg PO DAILY 30 Days  tab 08/13/20











                                    Allergies











Allergy/AdvReac Type Severity Reaction Status Date / Time


 


No Known Allergies Allergy   Verified 10/16/20 13:28














Review of Systems


ROS Other: All systems not noted in ROS Statement are negative.





<Da Melchor - Last Filed: 10/16/20 14:50>


ROS Other: All systems not noted in ROS Statement are negative.





<Linda Parker - Last Filed: 10/16/20 16:28>


ROS Statement: 


Those systems with pertinent positive or pertinent negative responses have been 

documented in the HPI.








Past Medical History


Past Medical History: No Reported History


History of Any Multi-Drug Resistant Organisms: None Reported


Past Surgical History: No Surgical Hx Reported


Past Psychological History: Anxiety, Depression


Smoking Status: Current every day smoker


Past Alcohol Use History: Abuse, Daily


Past Drug Use History: Heroin





<Da Melchor - Last Filed: 10/16/20 14:50>





General Exam


Limitations: no limitations





<Da Melchor - Last Filed: 10/16/20 14:50>





- General Exam Comments


Initial Comments: 





GENERAL:


Patient is well-developed and well-nourished.  Patient is nontoxic and well-

hydrated and is in no acute distress.





ENT:


Neck is soft and supple.  No significant lymphadenopathy is noted.  Oropharynx 

is clear.  Moist mucous membranes.  Neck has full range of motion without 

eliciting any pain. 





EYES:


The sclera were anicteric and conjunctiva were pink and moist.  Extraocular 

movements were intact and pupils were equal round and reactive to light.  

Eyelids were unremarkable.





PULMONARY:


Unlabored respirations.  Good breath sounds bilaterally.  No audible rales 

rhonchi or wheezing was noted.





CARDIOVASCULAR:


There is a regular rate and rhythm without any murmurs gallops or rubs. 





ABDOMEN:


Soft and nontender with normal bowel sounds.  





SKIN:


Skin is clear with no lesions or rashes and otherwise unremarkable.





NEUROLOGIC:


Patient is alert and oriented x3.  Cranial nerves II through XII are grossly 

intact.  Motor and sensory are also intact.  Normal speech, volume and content. 

Symmetrical smile.  





MUSCULOSKELETAL:


Normal extremities with adequate strength and full range of motion.





LYMPHATICS:


No significant lymphadenopathy is noted





PSYCHIATRIC:


Patient states he is suicidal.  (Da Melchor)





Course


                                   Vital Signs











  10/16/20





  13:26


 


Temperature 98.9 F


 


Pulse Rate 80


 


Respiratory 20





Rate 


 


Blood Pressure 155/95


 


O2 Sat by Pulse 99





Oximetry 














Medical Decision Making





<Da Melchor - Last Filed: 10/16/20 14:50>





<Linda Parker - Last Filed: 10/16/20 16:28>





- Medical Decision Making





Dr. Parker will be taking over the care of this patient at 3 PM (Da Melchor)


Patient was evaluated by EPS and they do recommend inpatient placement 

(Linda Parker)





- Lab Data


                                   Lab Results











  10/16/20 Range/Units





  14:31 


 


Urine Opiates Screen  Not Detected  (NotDetected)  


 


Ur Oxycodone Screen  Not Detected  (NotDetected)  


 


Urine Methadone Screen  Not Detected  (NotDetected)  


 


Ur Propoxyphene Screen  Not Detected  (NotDetected)  


 


Ur Barbiturates Screen  Detected H  (NotDetected)  


 


U Tricyclic Antidepress  Not Detected  (NotDetected)  


 


Ur Phencyclidine Scrn  Not Detected  (NotDetected)  


 


Ur Amphetamines Screen  Not Detected  (NotDetected)  


 


U Methamphetamines Scrn  Not Detected  (NotDetected)  


 


U Benzodiazepines Scrn  Not Detected  (NotDetected)  


 


Urine Cocaine Screen  Not Detected  (NotDetected)  


 


U Marijuana (THC) Screen  Not Detected  (NotDetected)  














Disposition





<Da Melchor - Last Filed: 10/16/20 14:50>


Is patient prescribed a controlled substance at d/c from ED?: No


Decision to Admit Reason: Admit from EC


Decision Date: 10/16/20


Decision Time: 16:27





<Linda Parker - Last Filed: 10/16/20 16:28>


Clinical Impression: 


 Depression, Bipolar disorder





Disposition: ADMITTED AS IP TO THIS [facility]


Condition: Stable

## 2020-10-17 LAB
ALBUMIN SERPL-MCNC: 4.4 G/DL (ref 3.5–5)
ALP SERPL-CCNC: 85 U/L (ref 38–126)
ALT SERPL-CCNC: 68 U/L (ref 4–49)
ANION GAP SERPL CALC-SCNC: 8 MMOL/L
AST SERPL-CCNC: 39 U/L (ref 17–59)
BASOPHILS # BLD AUTO: 0.1 K/UL (ref 0–0.2)
BASOPHILS NFR BLD AUTO: 2 %
BUN SERPL-SCNC: 14 MG/DL (ref 9–20)
CALCIUM SPEC-MCNC: 9.5 MG/DL (ref 8.4–10.2)
CHLORIDE SERPL-SCNC: 105 MMOL/L (ref 98–107)
CHOLEST SERPL-MCNC: 273 MG/DL (ref ?–200)
CO2 SERPL-SCNC: 25 MMOL/L (ref 22–30)
EOSINOPHIL # BLD AUTO: 0.2 K/UL (ref 0–0.7)
EOSINOPHIL NFR BLD AUTO: 3 %
ERYTHROCYTE [DISTWIDTH] IN BLOOD BY AUTOMATED COUNT: 5.02 M/UL (ref 4.3–5.9)
ERYTHROCYTE [DISTWIDTH] IN BLOOD: 13.3 % (ref 11.5–15.5)
GLUCOSE SERPL-MCNC: 111 MG/DL (ref 74–99)
HBA1C MFR BLD: 5.5 % (ref 4–6)
HCT VFR BLD AUTO: 48.5 % (ref 39–53)
HDLC SERPL-MCNC: 53 MG/DL (ref 40–60)
HGB BLD-MCNC: 16.1 GM/DL (ref 13–17.5)
LDLC SERPL CALC-MCNC: 190 MG/DL (ref 0–99)
LYMPHOCYTES # SPEC AUTO: 2.3 K/UL (ref 1–4.8)
LYMPHOCYTES NFR SPEC AUTO: 41 %
MCH RBC QN AUTO: 32 PG (ref 25–35)
MCHC RBC AUTO-ENTMCNC: 33.2 G/DL (ref 31–37)
MCV RBC AUTO: 96.6 FL (ref 80–100)
MONOCYTES # BLD AUTO: 0.4 K/UL (ref 0–1)
MONOCYTES NFR BLD AUTO: 7 %
NEUTROPHILS # BLD AUTO: 2.5 K/UL (ref 1.3–7.7)
NEUTROPHILS NFR BLD AUTO: 45 %
PLATELET # BLD AUTO: 375 K/UL (ref 150–450)
POTASSIUM SERPL-SCNC: 4.5 MMOL/L (ref 3.5–5.1)
PROT SERPL-MCNC: 7.3 G/DL (ref 6.3–8.2)
SODIUM SERPL-SCNC: 138 MMOL/L (ref 137–145)
TRIGL SERPL-MCNC: 152 MG/DL (ref ?–150)
WBC # BLD AUTO: 5.6 K/UL (ref 3.8–10.6)

## 2020-10-17 RX ADMIN — NICOTINE SCH: 14 PATCH, EXTENDED RELEASE TRANSDERMAL at 08:15

## 2020-10-17 RX ADMIN — LURASIDONE HYDROCHLORIDE SCH MG: 40 TABLET, FILM COATED ORAL at 21:45

## 2020-10-17 RX ADMIN — Medication SCH MG: at 08:15

## 2020-10-17 NOTE — HP
DATE OF SERVICE:  10/17/2020



HISTORY AND PHYSICAL



IDENTIFYING DATA:

The patient is a 50-year-old male.  He has been residing at [facility] for 
substance

abuse treatment.  He was referred through the emergency room for evaluation.



CHIEF COMPLAINT:

The patient had been withdrawing from alcohol.  He had thoughts of suicide with 
a plan

of signing out of [facility] to kill himself.  He had a plan to make that 
happen.



HISTORY OF PRESENTING ILLNESS:

The patient has had long-term problems with depression.  He had a recent 
psychiatric

admission to this facility from 8/10 to 8/13/2020 and I refer the reader to Dr. Melvin

notes for detail.  At that time he was having increasing problems with 
depression.  He

had been on some psychotropic medications, though ran out of those.  He was 
feeling

increasingly depressed and hopeless.  He also had a number of stress issues 
including

having had 3 DUIs in the previous 3 months.  He was discharged 08/13/2020.



Discharge medications included Prozac 40 mg a day and Zyprexa 5 mg a day.  The 
patient

stated that after he left the hospital, he returned to active drinking.  He said
that

he has had depression over the last several years, though drinking issues 
started up in

May which he described as "self treatment for depression."  He said that 
depression

goes back at least over the last 3 or 4 years after he lost a job  when he was 
working

in [place], he moved back to [place].  He said he has had a lot of trouble with

depression and anxiety.  He has stated he has had 1 or 2 manic episodes.  He 
noted he

was on Zyprexa which he felt helped with sara, though did not really help with

depression.  He noted for the most part that Zyprexa helped him sleep.  He has 
had

significant anxiety and some panic symptoms.  He noted that he had some anxiety 
issues

through his teens.  He felt that he compensated for that in his 20s and 30s by 
becoming

very active doing things like competitive mountain biking.  He said he was very

persistent in these activities through those 2 decades.  He said in his 40s, he 
started

to veer away from that.  He noted that his drinking was progressively getting 
worse in

the last few months.  He felt he had significant withdrawal issues for a period 
of

time.  During his last hospitalization he said pretty much from the time that he
got

discharged to present he got back into drinking.  He acknowledges drinking about
8-10

beers a day.  He was somewhat vague about details, though said at one point he 
went

into [facility] for a couple weeks.  From there he left and then was 
homeless for

short period of time after that he got into [facility], though then left again
and

had some days of being homeless leading up to his current admission.  He says 
that he

has been sleeping poorly.  He has loss of motivation, energy and interest.  He 
denies

hallucinations or delusions.  He does not identify significant panic symptoms.

Currently, he does not identify any posttraumatic issues.  He said he had run 
out of

his medications as he did not follow up with his primary care physician.



It is noted that the patient had not felt that Zyprexa was helpful for 
depression.  He

said he had reviewed issues with a physician with the recommendation that Latuda
might

be a reasonable option that could also help with depression.



He is admitted for further evaluation.



SUBSTANCE USE HISTORY:

Patient said that in the past he has used other substances.  He was vague on 
details,

though indicates that he has used Ron, methamphetamine, cocaine at other 
times in

previous years.



PAST MEDICAL HISTORY:

Patient denies any current or chronic general health complaints.



FAMILY AND SOCIAL HISTORY:

Patient is single.  He has no children.  He has a BS degree in biology.  He 
worked in

software development.  He has been unemployed in recent times.



MENTAL STATUS EXAM:

Patient sat with some restlessness.  He gave fair eye contact.  He answered 
questions

with brief responses.  At times, he rambled some.  His thoughts otherwise were 
clear

and coherent.  His affect was anxious.  His mood depressed.  He was moderately

distressed.  There was no indication of thought disorder.  He denied thoughts of
harm

to self or others at the time that I interviewed him, though acknowledges that 
he did

have suicide thoughts leading up to coming into the hospital. On cognitive exam,
he was

oriented x3 and alert.  Recent remote memory was intact.  Attention and 
concentration

fair.  Insight and judgment fair to poor.  Fund of knowledge average to somewhat
above

average.



PHYSICAL EXAM:

As per medical consultation.



ASSESSMENT:

This is a 50-year-old male is diagnosed with alcohol dependence and acute 
alcohol

withdrawal along with chronic depression.  His immediate precipitating factors 
are

relapse to drinking.  It is unclear what all is related to his longer-term 
problems

with depression.



STRENGTHS:

Include native intelligence.



WEAKNESSES:

Includes relapse to drinking.



DIAGNOSES:

1. Alcohol dependence and acute alcohol withdrawal.

2. Major depression, chronic and recurrent with acute exacerbation.

3. Rule out bipolar affective disorder.



RECOMMENDATIONS:

Patient will be admitted for comprehensive medical psychiatric and psychosocial

evaluation.  We will engage the patient in individual and group therapeutic 
activities.

I reviewed medication options with the patient. We discussed the possibility of 
his

going back on Zyprexa with the primary focus of benefits for withdrawal.  I 
discussed

Latuda as an alternative.  The patient was interested in trying Latuda.  We 
discussed

that the primary focus at this point would be in helping him through acute 
withdrawal

and that it would be difficult to be clear what would be appropriate treatment 
options

until he is at least 6-8 weeks clean from alcohol and any other abusive 
substances.  It

would be discussed that it would be reasonable to initiate Latuda if he does in 
fact

have an underlying diagnosis of bipolar disorder.  The patient had also mention

Wellbutrin.  I noted that there is potential risks for antidepressants in the 
face of

bipolar disorder.  At this point, I will start the patient on Latuda 60 mg a 
day, which

was his preference over Zyprexa.  I had an extensive discussion regarding 
withdrawal

related issues including the time course of withdrawal and expectations.  We 
will focus

on stabilization and discharge planning.





NANI / LUIS ENRIQUEN: [id number] / Job#: [id number]

GILMAR

## 2020-10-18 RX ADMIN — Medication SCH MG: at 08:48

## 2020-10-18 RX ADMIN — LURASIDONE HYDROCHLORIDE SCH MG: 40 TABLET, FILM COATED ORAL at 08:48

## 2020-10-18 RX ADMIN — NICOTINE SCH: 14 PATCH, EXTENDED RELEASE TRANSDERMAL at 08:48

## 2020-10-18 NOTE — PN
PROGRESS NOTE



DATE OF SERVICE:

10/18/2020



CHIEF COMPLAINT:

The patient had been withdrawn from alcohol.  He had thoughts of suicide with a plan of

signing out of [facility] to kill himself.  He had plans to make that happen.



INTERVAL HISTORY:

Patient has been doing fair.  He had a quiet day yesterday.  Mostly he was withdrawn

and kept to himself.  He comes out in the day area and wonders about.  He did interact

with some staff and peers.  He did not attend groups yesterday.  He said he had a lot

of anxiety and was feeling quite distressed.  He said he only slept three hours last

night.  Today he has been up.  He continues to feel quite anxious.  He had been asking

for Ativan.  We discussed the issues of detox and that Ativan would be indicated

specifically for detox to reduce risk for the patient going into delirium tremens.  He

is beyond the point of risk for DTs.  In addition, it is noted his vital signs have

been stable.  He is running a pulse at midnight of 100 and this morning at 9:00 of 103.

Blood pressure this morning was 102/67.  His temperature has been in the normal range.

He has not had diaphoresis.  The patient did not note any clear problems or issues

taking Latuda, though he does not feel it helped him in any way yesterday.  When I

reviewed medication issues with the patient, particularly in terms of focusing on

withdrawal issues, the patient was comfortable with the idea of discontinuing Latuda

and starting on Zyprexa.



MENTAL STATUS:

Patient sat with a little restlessness.  He gave fair eye contact.  He answered

questions with brief responses.  His thoughts were clear.  Initially his affect was

somewhat tense with an anxious manner though towards the end of the interview he smiled

some and seem to be in a little more calm disposition.  His mood was dysphoric though

not clearly down or depressed.  He appeared to be moderately distressed primarily with

withdrawal symptoms.  There was no indication of thought disorder.  He voiced no

thoughts of harm.  Cognition was clear.



ASSESSMENT:

I will continue the current diagnosis and treatment plan.  In the meanwhile, I will

discontinue Latuda and start the patient on Zyprexa 5 mg 3 times a day.  I reviewed the

indication for Zyprexa, namely to help reduce physiologic stress response relating to

acute alcohol withdrawal.  I discussed potential side effects including issues relating

to metabolics and movement disorder.  At this point, the patient will also receive

trazodone 100 mg at bedtime.  I would anticipate the dosing of Zyprexa with the

addition of trazodone should improve his sleep.  I continue to discuss issues related

to substance withdrawal.  We will focus on stabilization and discharge planning.





NANI / DUANE: [id number] / Job#: [id number]

## 2020-10-19 RX ADMIN — NICOTINE SCH: 14 PATCH, EXTENDED RELEASE TRANSDERMAL at 08:55

## 2020-10-19 RX ADMIN — Medication SCH MG: at 08:55

## 2020-10-19 NOTE — P.PN
Progress Note - Text


Progress Note Date: 10/19/20





I reviewed medical records ,did interview patient and case was discussed in 

treatment team


TODAY VITALS: Temp:97.7,P:71,R:14,BP:133/85





Some participation in milieu


Slept :7 hours


Did review medical consult








INTERVAL : patient was laying in bed and ,agreed to come to office , stated that

his last alcohol use was 5 days ago ,he had 12 beers ,he denies any withdrawal 

symptoms ,stated that taking Zyprexa three times helping his anxiety ,I 

explained to him metabolic side-effect from Zyprexa ,he verbalized understanding

,he stated "Please do not cut down dose",he rates his depression 8/10 ,anxiety 

6/10 ,10 being the worst ,denies any current suicidal or homicidal 

ideation,denies any psychotic features,discussed post plan discharge to recovery

[facility]





Mental status exam: Patient was wearing his own clothing ,hygiene and grooming 

are marginal ,  ,denies any hallucination or delusional thinking,, alert to 

person and place ,denies suicidal or homicidal ideation ,insight and judgment 

are limited





ASSESSMENT :Major depression versus Bipolar unspecified ,alcohol use disorder





PLAN:  Patient continues to meet criteria for inpatient psychiatric admission 

for symptom stabilization start Lexapro for depression and anxiety ,continue 

Zyprexa 5 mg TID and Trazodone for insomnia  ,PRN Vistaril for anxiety  

,encourage groups participation ,SW on board for discharge planning

## 2020-10-20 RX ADMIN — ESCITALOPRAM OXALATE SCH MG: 10 TABLET, FILM COATED ORAL at 08:54

## 2020-10-20 RX ADMIN — Medication SCH MG: at 08:54

## 2020-10-20 RX ADMIN — NICOTINE SCH: 14 PATCH, EXTENDED RELEASE TRANSDERMAL at 08:54

## 2020-10-20 NOTE — P.PN
Progress Note - Text


Progress Note Date: 10/20/20





I reviewed medical records ,did interview patient and case was discussed in 

treatment team


CIWA :0





Did participate in 3-4 groups yesterday


Slept :7 wade





INTERVAL : patient was laying in bed and ,agreed to come to office,denies any 

sleeping or appetite problems ,has been compliant with medications and denies 

any side-effects,stated that his anxiety is less since has been taking Zyprexa 

three times a day ,still feeling depressed due to ongoing stressor  

:homeless,unemployed  ,legal problems(3 DUI) and no income but denies any 

current suicidal ideation,denies feeling hopeless or helpless,denies any current

manic features ,described hypomanic features:racing thoughts and irritability 

but he stated that these symptoms improving with Zyprexa





Mental status exam: Patient was wearing his own clothing ,hygiene and grooming 

are fair,  ,denies any hallucination or delusional thinking,, alert to person 

and place ,denies suicidal or homicidal ideation ,insight and judgment are 

limited





ASSESSMENT : Bipolar unspecified ,alcohol use disorder





PLAN:  Patient continues to meet criteria for inpatient psychiatric admission 

for symptom stabilization continue Lexapro for depression and anxiety ,continue 

Zyprexa 5 mg TID for racing thoughts and mood stabilization , Trazodone for 

insomnia  ,PRN Vistaril for anxiety  ,encourage groups participation ,SW on 

board for discharge planning 


Patient will call recovery housing today

## 2020-10-21 VITALS
HEART RATE: 52 BPM | TEMPERATURE: 97.5 F | DIASTOLIC BLOOD PRESSURE: 66 MMHG | SYSTOLIC BLOOD PRESSURE: 114 MMHG | RESPIRATION RATE: 16 BRPM

## 2020-10-21 RX ADMIN — ESCITALOPRAM OXALATE SCH MG: 10 TABLET, FILM COATED ORAL at 08:50

## 2020-10-21 RX ADMIN — NICOTINE SCH: 14 PATCH, EXTENDED RELEASE TRANSDERMAL at 08:50

## 2020-10-21 RX ADMIN — Medication SCH MG: at 08:50

## 2020-10-21 NOTE — DS
DISCHARGE SUMMARY



DATE OF ADMISSION:

10/16/2020.



DATE OF DISCHARGE:

10/21/2020.



MEDICAL CONSULTANT:

On Dr. Lester Sutton for history and physical exam and medical management.



DISCHARGE DIAGNOSES:

1. Alcohol use disorder, severe.

2. Bipolar disorder, unspecified versus chronic major depression, recurrent.



HISTORY AND PHYSICAL EXAMINATION:

At the time of the admission, the patient is 50 years,  male, who is currently

homeless and he was at [facility] for substance abuse treatment only for 2 days and

he stated that he started feeling suicidal with a plan to sign himself out of the

[facility] to kill himself, so he was referred to the emergency room for admission.

Patient stated that he has been stressed out as he does not have any income.  No

housing and over the last couple of months.  He has 3 drunk driving ticket.  Patient

has poor compliance with followup treatment regarding his substance use and his

recurrent relapse.  For complete history and physical examination please refer to Dr. Don's dictation on October 17, 2020.



HOSPITAL COURSE:

Patient was admitted on involuntary basis.  He was initially under Dr. Don's care

for 2 days, October 17 and October 18, who did start the patient back on his Prozac and

Zyprexa 5 mg and Ativan as needed for any alcohol withdrawal.  However, the patient did

have 12 beer at least 3 days prior to his admission, so Ativan was discontinued and he

was having p.r.n. Vistaril.  The patient was very upset after he did find out that the

Ativan was discontinued and he stated that he needs to discuss it with Dr. Don

regarding why he did change his medication, so Dr. Don decided to give him Zyprexa 3

times a day for anxiety 5 mg 3 times a day.  When I saw the patient, the patient did

not have any insight into his alcohol use.  He stated that he is depressed and anxious,

but he denied any suicidal ideation.  He was participating in every group.  I did

discuss with him to cut down the Zyprexa due to the side effect and the metabolic side

effect of Zyprexa, but he was very reluctant stated that it does help him to calm down.

Regarding his lab, the patient has high cholesterol at 273 and his triglycerides it is

152 and  and I did discuss with him this in addition the medical consultant did

discuss it with him.  Also, his urine drug screen was positive for barbiturate, but

patient was not clear about how did he get the barbiturate throughout the course of the

hospitalization.  Patient gradually improved with regard of his mood, anxiety and

sleep, especially when trazodone was added as needed for sleep.  On October 20, he told

me that he will call [facility].  However, when I saw him on October 21, he

stated that he does not remember or he said "I just put it off because I saw that I

will stay here and tell October 24."  He stated that he has already an appointment for

intake at [facility] in the [facility] on Saturday, October 24.  On the day of the

discharge, patient denied any suicidal or homicidal ideation, intent, or plan.  He

denied any auditory or visual hallucination.  He stated that he will work on his

sobriety.  He denied any access to gun or weapon.  According to him, he has saving that

he can stay in the hotel and tells Saturday, October 24 when he has intake for

recovering housing.  He denied any paranoia.  He did not endorse any delusional

thinking.  The patient does have a significant history of substance abuse and was

counseled on abstaining from alcohol.  The patient was offered to go back to [facility], but he refused.  He stated that he preferred to go to recovery housing and

during his previous discharge, he did have a bed in sober housing at [facility] on

August 14, but he did not pursue it and he did relapse on alcohol.  The patient was

also counseled on the medication and the side effect of the Zyprexa, especially he has

high cholesterol.  He was encouraged to follow up with his primary care physician

regarding his high cholesterol.



MENTAL STATUS EXAMINATION:

At the time of the discharge, patient appears to be stated age.  Alert, cooperative,

pleasant, in no acute distress.  He was sitting calmly.  No agitation.  Speech is

coherent.  He reported that his mood is better.  Affect is constricted.  He denied

having any suicidal or homicidal ideation, intent, or plan.  He denied any auditory or

visual hallucination.  There is no evidence of any delusional thinking. Thought process

is linear and goal directed.  He is alert, oriented x3.  Memory is grossly intact.  His

insight and judgment improved.  However, his insight regarding his alcohol use is still

questionable.



DISCHARGE DIAGNOSES:

1. Alcohol use disorder, severe.

2. Bipolar disorder, unspecified.

3. Nicotine dependence.



PLAN:

The patient will be discharged today as he has improved regarding his depression and he

is not imminent to hurt himself or other.  The patient has to keep his appointment for

intake for the recovery or sober housing on October 24, the patient was given 2 week

supply with I refilled.

1. Zyprexa 5 mg 3 times a day for mood stabilization.

2. Lexapro 10 mg daily for 2 weeks and 1 refill.

3. Trazodone 100 mg at bedtime for insomnia for two weeks and 1 refill.

The patient stated that he will stay in motel for the next 4 days until his intake at

the sober housing.  Patient was instructed to return to the hospital if symptoms

reoccur.





NANI / DUANE: [id number] / Job#: [id number]

## 2021-01-15 ENCOUNTER — HOSPITAL ENCOUNTER (INPATIENT)
Dept: HOSPITAL 47 - EC | Age: 51
LOS: 6 days | Discharge: HOME | DRG: 885 | End: 2021-01-21
Attending: PSYCHIATRY & NEUROLOGY | Admitting: PSYCHIATRY & NEUROLOGY
Payer: COMMERCIAL

## 2021-01-15 DIAGNOSIS — F41.9: ICD-10-CM

## 2021-01-15 DIAGNOSIS — F10.239: ICD-10-CM

## 2021-01-15 DIAGNOSIS — F23: ICD-10-CM

## 2021-01-15 DIAGNOSIS — F31.30: ICD-10-CM

## 2021-01-15 DIAGNOSIS — R45.851: ICD-10-CM

## 2021-01-15 DIAGNOSIS — F17.200: ICD-10-CM

## 2021-01-15 DIAGNOSIS — Z79.899: ICD-10-CM

## 2021-01-15 DIAGNOSIS — Z20.822: ICD-10-CM

## 2021-01-15 DIAGNOSIS — F15.10: ICD-10-CM

## 2021-01-15 DIAGNOSIS — F31.81: Primary | ICD-10-CM

## 2021-01-15 PROCEDURE — 84443 ASSAY THYROID STIM HORMONE: CPT

## 2021-01-15 PROCEDURE — 82248 BILIRUBIN DIRECT: CPT

## 2021-01-15 PROCEDURE — 82075 ASSAY OF BREATH ETHANOL: CPT

## 2021-01-15 PROCEDURE — 80061 LIPID PANEL: CPT

## 2021-01-15 PROCEDURE — 85025 COMPLETE CBC W/AUTO DIFF WBC: CPT

## 2021-01-15 PROCEDURE — 80306 DRUG TEST PRSMV INSTRMNT: CPT

## 2021-01-15 PROCEDURE — 80053 COMPREHEN METABOLIC PANEL: CPT

## 2021-01-15 PROCEDURE — 81003 URINALYSIS AUTO W/O SCOPE: CPT

## 2021-01-15 PROCEDURE — 87635 SARS-COV-2 COVID-19 AMP PRB: CPT

## 2021-01-15 PROCEDURE — 99285 EMERGENCY DEPT VISIT HI MDM: CPT

## 2021-01-15 PROCEDURE — 83036 HEMOGLOBIN GLYCOSYLATED A1C: CPT

## 2021-01-15 NOTE — ED
Psych HPI





- General


Chief Complaint: Psychiatric Symptoms


Stated Complaint: Mental Health


Time Seen by Provider: 01/15/21 23:50


Source: patient, family


Mode of arrival: ambulatory





- History of Present Illness


Initial Comments: 





This is a 50-year-old male DF for evaluation patient presents with alcohol 

withdrawal psychiatric illness.  Patient presents today feels suicidal, concern 

for alcohol withdrawal, patient states he does have prior history of similar no 

prior inpatient psychiatric admission


MD Complaint: suicidal ideation, feels depressed


-: days(s)


Associated Psychiatric Symptoms: suicidal ideation


History of same: Yes


Quality: constant


Improves With: none


Worsens With: alcohol


Context: recent alcohol abuse, not taking psychiatric medications


Associated Symptoms: denies other symptoms


Treatments Prior to Arrival: placed on mental health hold


If Self Harm: admits thoughts of self harm





- Related Data


                                  Previous Rx's











 Medication  Instructions  Recorded


 


Folic Acid 1 mg PO DAILY 30 Days  tab 08/13/20


 


Multivitamins, Thera [Multivitamin 1 each PO DAILY 30 Days  tab 08/13/20





(formulary)]  


 


Thiamine [Vitamin B-1] 100 mg PO DAILY 30 Days  tab 08/13/20


 


Escitalopram [Lexapro] 10 mg PO DAILY 14 Days  tab 10/21/20


 


OLANZapine [ZyPREXA] 5 mg PO TID 14 Days  tab 10/21/20


 


traZODone HCL [Desyrel] 100 mg PO HS 14 Days  tab 10/21/20











                                    Allergies











Allergy/AdvReac Type Severity Reaction Status Date / Time


 


No Known Allergies Allergy   Verified 01/16/21 03:38














Review of Systems


ROS Statement: 


Those systems with pertinent positive or pertinent negative responses have been 

documented in the HPI.





ROS Other: All systems not noted in ROS Statement are negative.





Past Medical History


Past Medical History: No Reported History


Additional Past Medical History / Comment(s): depression


History of Any Multi-Drug Resistant Organisms: None Reported


Past Surgical History: No Surgical Hx Reported


Past Anesthesia/Blood Transfusion Reactions: No Reported Reaction


Past Psychological History: Anxiety, Depression


Smoking Status: Current every day smoker


Past Alcohol Use History: Abuse, Daily


Past Drug Use History: Heroin





- Past Family History


  ** Father


Family Medical History: Chest Pain / Angina





General Exam


Limitations: no limitations


General appearance: alert, in no apparent distress


Head exam: Present: atraumatic, normocephalic, normal inspection


Eye exam: Present: normal appearance, PERRL, EOMI.  Absent: scleral icterus, 

conjunctival injection, periorbital swelling


ENT exam: Present: normal exam, mucous membranes moist


Neck exam: Present: normal inspection.  Absent: tenderness, meningismus, 

lymphadenopathy


Respiratory exam: Present: normal lung sounds bilaterally.  Absent: respiratory 

distress, wheezes, rales, rhonchi, stridor


Cardiovascular Exam: Present: regular rate, normal rhythm, normal heart sounds. 

 Absent: systolic murmur, diastolic murmur, rubs, gallop, clicks


GI/Abdominal exam: Present: soft, normal bowel sounds.  Absent: distended, 

tenderness, guarding, rebound, rigid


Extremities exam: Present: normal inspection, full ROM, normal capillary refill.

  Absent: tenderness, pedal edema, joint swelling, calf tenderness


Back exam: Present: normal inspection


Neurological exam: Present: alert, oriented X3, CN II-XII intact


Psychiatric exam: Present: normal affect, normal mood


Skin exam: Present: warm, dry, intact, normal color.  Absent: rash





Course


                                   Vital Signs











  01/15/21 01/16/21





  23:44 01:25


 


Temperature 98.7 F 


 


Pulse Rate 83 88


 


Respiratory 22 16





Rate  


 


Blood Pressure 104/73 134/85


 


O2 Sat by Pulse 99 97





Oximetry  














- Reevaluation(s)


Reevaluation #1: 





Medical clear for psychiatric evaluation





Patient seen and evaluated here in the ER








Medical Decision Making





- Medical Decision Making





50 male to be admitted for inpatient psychiatric evaluation and treatment 

patient did sign himself in





- Lab Data


Result diagrams: 


                                 01/16/21 07:55





                                 01/16/21 07:55


                                   Lab Results











  01/16/21 Range/Units





  02:14 


 


Urine Color  Yellow  


 


Urine Appearance  Clear  (Clear)  


 


Urine pH  5.5  (5.0-8.0)  


 


Ur Specific Gravity  1.011  (1.001-1.035)  


 


Urine Protein  Negative  (Negative)  


 


Urine Glucose (UA)  Negative  (Negative)  


 


Urine Ketones  Negative  (Negative)  


 


Urine Blood  Negative  (Negative)  


 


Urine Nitrite  Negative  (Negative)  


 


Urine Bilirubin  Negative  (Negative)  


 


Urine Urobilinogen  <2.0  (<2.0)  mg/dL


 


Ur Leukocyte Esterase  Negative  (Negative)  














Disposition


Clinical Impression: 


 Acute psychosis, Alcohol use disorder, moderate, dependence, Bipolar disorder, 

Depression





Disposition: TRANSFER TO PSYCH HOSP/UNIT


Condition: Fair


Is patient prescribed a controlled substance at d/c from ED?: No

## 2021-01-16 LAB
ALBUMIN SERPL-MCNC: 4.2 G/DL (ref 3.5–5)
ALP SERPL-CCNC: 97 U/L (ref 38–126)
ALT SERPL-CCNC: 26 U/L (ref 4–49)
ANION GAP SERPL CALC-SCNC: 6 MMOL/L
AST SERPL-CCNC: 31 U/L (ref 17–59)
BASOPHILS # BLD AUTO: 0.1 K/UL (ref 0–0.2)
BASOPHILS NFR BLD AUTO: 1 %
BILIRUB INDIRECT SERPL-MCNC: 0.2 MG/DL (ref 0–1.1)
BILIRUBIN DIRECT+TOT PNL SERPL-MCNC: 0.3 MG/DL (ref 0–0.2)
BUN SERPL-SCNC: 14 MG/DL (ref 9–20)
CALCIUM SPEC-MCNC: 9.3 MG/DL (ref 8.4–10.2)
CHLORIDE SERPL-SCNC: 107 MMOL/L (ref 98–107)
CHOLEST SERPL-MCNC: 242 MG/DL (ref ?–200)
CO2 SERPL-SCNC: 24 MMOL/L (ref 22–30)
EOSINOPHIL # BLD AUTO: 0.2 K/UL (ref 0–0.7)
EOSINOPHIL NFR BLD AUTO: 3 %
ERYTHROCYTE [DISTWIDTH] IN BLOOD BY AUTOMATED COUNT: 5.11 M/UL (ref 4.3–5.9)
ERYTHROCYTE [DISTWIDTH] IN BLOOD: 11.8 % (ref 11.5–15.5)
GLUCOSE SERPL-MCNC: 111 MG/DL (ref 74–99)
HBA1C MFR BLD: 5.5 % (ref 4–6)
HCT VFR BLD AUTO: 46.2 % (ref 39–53)
HDLC SERPL-MCNC: 63 MG/DL (ref 40–60)
HGB BLD-MCNC: 16 GM/DL (ref 13–17.5)
LDLC SERPL CALC-MCNC: 153 MG/DL (ref 0–99)
LYMPHOCYTES # SPEC AUTO: 2.2 K/UL (ref 1–4.8)
LYMPHOCYTES NFR SPEC AUTO: 29 %
MCH RBC QN AUTO: 31.3 PG (ref 25–35)
MCHC RBC AUTO-ENTMCNC: 34.6 G/DL (ref 31–37)
MCV RBC AUTO: 90.5 FL (ref 80–100)
MONOCYTES # BLD AUTO: 0.3 K/UL (ref 0–1)
MONOCYTES NFR BLD AUTO: 4 %
NEUTROPHILS # BLD AUTO: 4.8 K/UL (ref 1.3–7.7)
NEUTROPHILS NFR BLD AUTO: 62 %
PH UR: 5.5 [PH] (ref 5–8)
PLATELET # BLD AUTO: 334 K/UL (ref 150–450)
POTASSIUM SERPL-SCNC: 4.9 MMOL/L (ref 3.5–5.1)
PROT SERPL-MCNC: 6.9 G/DL (ref 6.3–8.2)
SODIUM SERPL-SCNC: 137 MMOL/L (ref 137–145)
SP GR UR: 1.01 (ref 1–1.03)
TRIGL SERPL-MCNC: 132 MG/DL (ref ?–150)
UROBILINOGEN UR QL STRIP: <2 MG/DL (ref ?–2)
WBC # BLD AUTO: 7.7 K/UL (ref 3.8–10.6)

## 2021-01-16 RX ADMIN — LOPERAMIDE HYDROCHLORIDE PRN MG: 2 CAPSULE ORAL at 13:09

## 2021-01-16 RX ADMIN — NICOTINE SCH: 14 PATCH, EXTENDED RELEASE TRANSDERMAL at 08:53

## 2021-01-16 NOTE — P.CONS
History of Present Illness





- Reason for Consult


Consult date: 01/16/21





- History of Present Illness


The patient is a 50-year-old male with a PMH of EtOH abuse and Methamphetamine 

abuse who presented to the emergency room with depression and suicidal ideation.

 Patient was admitted to the mental health unit where he was seen and evaluated.

The patient reports that he drinks 10-15 beers daily for the past several years.

 He reports having to be hospitalized previously due to his alcohol withdrawal. 

Denied ICU admissions or alcohol withdrawal seizures.  He notes that his last 

drink was yesterday morning.  Reported feeling "the shakes" currently.  Denied 

additional complaints.  Denied chest pain, shortness of breath, fever, chills, 

abdominal pain, nausea, vomiting, cough.





Review of Systems





Pertinent positives and negatives as discussed in HPI, a complete review of 

systems was performed and all other systems are negative.





Past Medical History


Past Medical History: No Reported History


Additional Past Medical History / Comment(s): depression


History of Any Multi-Drug Resistant Organisms: None Reported


Past Surgical History: No Surgical Hx Reported


Past Anesthesia/Blood Transfusion Reactions: No Reported Reaction


Smoking Status: Current every day smoker





- Past Family History


  ** Father


Family Medical History: Chest Pain / Angina





Medications and Allergies


                                Home Medications











 Medication  Instructions  Recorded  Confirmed  Type


 


Folic Acid 1 mg PO DAILY 30 Days  tab 08/13/20 01/16/21 Rx


 


Multivitamins, Thera [Multivitamin 1 each PO DAILY 30 Days  tab 08/13/20 01/16/21 Rx





(formulary)]    


 


Thiamine [Vitamin B-1] 100 mg PO DAILY 30 Days  tab 08/13/20 01/16/21 Rx


 


Escitalopram [Lexapro] 10 mg PO DAILY 14 Days  tab 10/21/20 01/16/21 Rx


 


OLANZapine [ZyPREXA] 5 mg PO TID 14 Days  tab 10/21/20 01/16/21 Rx


 


traZODone HCL [Desyrel] 100 mg PO HS 14 Days  tab 10/21/20 01/16/21 Rx








                                    Allergies











Allergy/AdvReac Type Severity Reaction Status Date / Time


 


No Known Allergies Allergy   Verified 01/16/21 03:38














Physical Exam


Vitals: 


                                   Vital Signs











  Temp Pulse Pulse Pulse Resp BP BP


 


 01/16/21 16:34  97.9 F      


 


 01/16/21 11:47  97.6 F    76  20  


 


 01/16/21 08:53  97.6 F      


 


 01/16/21 03:33  99.2 F   90   15   105/68


 


 01/16/21 01:25   88    16  134/85 


 


 01/15/21 23:44  98.7 F  83    22  104/73 














  BP Pulse Ox


 


 01/16/21 16:34  


 


 01/16/21 11:47  135/87 


 


 01/16/21 08:53  


 


 01/16/21 03:33   98


 


 01/16/21 01:25   97


 


 01/15/21 23:44   99








                                Intake and Output











 01/16/21 01/16/21 01/16/21





 06:59 14:59 22:59


 


Other:   


 


  Weight 68.606 kg  














General: non toxic, no distress, appears at stated age, normal weight


Derm: no unusual rashes/lesions no unusual ecchymoses, warm, dry


Head: atraumatic, normocephalic, symmetric


Eyes: EOMI, no lid lag, anicteric sclera, pupils equal round reactive to light


ENT: Nose and ears atraumatic, no thrush,  no pharyngeal erythema


Neck: No thyromegaly, no cervical lymphadenopathy, trachea midline, supple


Mouth: no lip lesion, mucus membranes moist


Cardiovascular: S1S2 reg, no murmur, positive posterior tibial pulse bilateral, 

no edema, capillary refill less than 2 seconds


Lungs: CTA bilateral, no rhonchi, no rales , no accessory muscle use


Abdominal: soft,  nontender to palpation, no guarding, no appreciable organomega

ly, normal bowel sounds


Ext: no gross muscle atrophy,  muscle strength 5 out of 5 in all 4 extremities 

grossly, no contractures, 


Neuro:  CN II-XI grossly intact, light touch intact all 4 extremities, mild 

outstretched hand tremor


Psych: Alert, oriented, appropriate affect 





Results


CBC & Chem 7: 


                                 01/16/21 07:55





                                 01/16/21 07:55


Labs: 


                  Abnormal Lab Results - Last 24 Hours (Table)











  01/16/21 01/16/21 Range/Units





  02:25 07:55 


 


Glucose   111 H  (74-99)  mg/dL


 


Delta Bilirubin   0.3 H  (0.0-0.2)  mg/dL


 


Cholesterol   242 H  (<200)  mg/dL


 


LDL Cholesterol, Calc   153 H  (0-99)  mg/dL


 


HDL Cholesterol   63 H  (40-60)  mg/dL


 


Ur Amphetamines Screen  Detected H   (NotDetected)  


 


U Methamphetamines Scrn  Detected H   (NotDetected)  














Assessment and Plan


Plan: 





EtOH abuse, impending withdrawal


-Continue with Librium


-Thiamine, folic acid





Methamphetamine abuse


-Advised on the importance of cessation





Depression with suicidal ideation


-As per psychiatry





Thank you for allowing us to participate in the care of this patient.  We will 

follow peripherally.  Do not hesitate to contact us with questions.  Someone can

 be reached from the [facility] hospitalist group at all hours of the day 

at [phone].

## 2021-01-16 NOTE — P.HP
Psychiatric H&P





- .


H&P Date: 01/16/21


History & Physical: 


IDENTIFYING DATA: He is a 50-year-old single  male admitted psychiatric

unit voluntarily.





HISTORY OF PRESENT ILLNESS:  He presented to the emergency center voluntarily 

with complaints of suicidal ideation and a plan to drink himself to death.  He 

stated that he is been distressed and overwhelmed because he anticipates going 

longterm.  He perseverated on not being able to cope with  incarcerated for an 

extended period of time.  He talked about how he would rather die or as he put 

it "put a bullet through my head" before he would go to longterm.  Apparently he 

received his third driving while intoxicated citation this weeks; his 3rd in the

last 3 months.  He was incarcerated on Monday prior to this admission and 

released on bond on "Tuesday or Wednesday."  He has a hearing scheduled for 

01/19/2021 and fully anticipates that she would be incarcerated.





He talked about drinking daily to intoxication usually beer but sometimes 

whiskey.  He was evasive about the amount but suggested that when he drank beer 

would be at least "case" or a "fifth" whiskey.  He denied recent use of other 

drugs to get high, help him sleep or changes mood.  However, his UDS was 

positive for amphetamine and methamphetamine.  His breath alcohol level on 

presentation before meals was 0.061.





He described a long  history of alcohol and drug use problems beginning in late 

adolescent.  He received his first driving while intoxicated citation when he 

was "17 or 18" years old.  He controlled his alcohol and drug use "for many 

years" until he relapsed when he was approximately 46 years old while he was 

living in [place].  He attributed the relapse to undiagnosed psychiatric 

illness and perseverated about having diagnosis of ADHD, anxiety disorder, 

depression and bipolar illness.  He talked about feeling "on top of the world" 

for many years and when he turned 46 "crashing".  She discovered that 

methamphetamine created the same sense of euphoria that the experience before 

the abrupt change in his mood.  He has been using methamphetamine since.  As a 

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





PAST PSYCHIATRIC HISTORY: He first received mental health services when he is 

adolescent as a result of his alcohol use.  He believes that he has been 

admitted approximately 12 times to psychiatric hospitals either in [place] or 

in [place].  His first psychiatric hospitalization was in [place] when he was 

46 years old.  This is his third admission to our psychiatric unit.  He was 

discharged last in October 2020 with the diagnoses of alcohol use disorder 

severe in a bipolar illness.  His discharge plan including Zyprexa 5 mg 3 times 

a day, Lexapro 10 mg daily and trazodone 100 mg at night.  He was referred to 

the [facility] for residential substance abuse treatment.  





PAST MEDICAL HISTORY: He has no major medical illnesses.  He alleges that he is 

had medical hospitalizations for alcohol intoxication and alcohol withdrawal.





ALLERGIES: NO KNOWN DRUG ALLERGIES





SUBSTANCE USE HISTORY: As above





FAMILY PSYCHIATRIC/SUBSTANCE USE HISTORY: There is a history of mood disorders 

including bipolar illness on the maternal side of his family.





LEGAL HISTORY: As above





SOCIAL HISTORY: He is born and raised in [place].  He graduated 

high school and received a bachelor's degree from [employer].  He

is single and has no children.  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  He was terminated as a result of his methamphetamine use.  He worked 

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  He currently has no stable housing.





MENTAL STATUS EXAM: He presented as a disheveled 50-year-old  male with

a thick beard.  He made eye contact and attended the interview.  He had no 

distinguishing features or prominent physical abilities.  He had a distressed 

facial expression.  He is alert and oriented to person, place and time.  He 

showed no abnormality of psychomotor activity.  He was not restless.  He had no 

abnormal involuntary movements. His speech was pressured with increased volume 

and amount.  His affect was elevated but not intense or inappropriate.  He 

describes suicidal ideation and death wishes.  He denied homicidal ideation.  He

expressed feelings of hopelessness and helplessness particularly with regard to 

his legal problems.  He ruminated about his medical problems and his struggles 

"to feel normal."  He did not express ideas reference, paranoid ideation or 

delusions.  His thinking was abstract and associations were coherent, logical 

and goal directed.  He did not demonstrate perseveration, neologisms or 

blocking.  He denied hallucinations and did not appear to be responding to 

internal stimuli.  Global impression of intellect is average to above.  He is 

aware of his illness and need for treatment.





STRENGTHS:  Good physical health, employment history, supportive family





WEAKNESSES: Chronic alcohol and substance use problems, legal problems





IMPRESSION: 50-year-old single  male admitted psychiatric unit 

voluntarily with complaints of suicidal ideation and death wishes.  He became 

acutely distressed after he was arrested for his third driving while intoxicated

offense.  He he is convinced that he was likely be incarcerated.  He 

perseverated on his inability to cope with the extended incarceration.  His long

history of alcohol and substance abuse problems beginning in adolescence.  He 

drinks on a daily basis usually to intoxication.  In addition, he's been using 

methamphetamine.  Overall, he is shown a marked decline in his functioning over 

the last 4 years.  He has signs and symptoms suggestive of a bipolar illness in 

addition to the substance use problems.  He should best be treated inpatient 

basis with a combination of psychopharmacology and multimodal therapy.





PRINCIPLE DIAGNOSIS: Alcohol withdrawal, alcohol use disorder severe, bipolar 

illness with recent episode hypomanic, legal problems





RECOMMENDATION: Admitted to the psychiatric unit.  Safety precautions.  Consult 

medicine for initial physical exam and medical history.   completed

initial psychosocial assessment coordinate discharge and aftercare.  Librium 25 

mg 3 times a day for alcohol withdrawal and titrated according to clinical 

response and tolerance.  Symptomatic dosing of Ativan 1 mg or 2 mg based on the 

CIWA scores.  Haldol 5 mg by mouth or IM every 6 hours for agitation or 

aggression.  Once the acute alcohol withdrawal symptoms subsides then reevaluate

treatment of the bipolar illness.  Evaluate clinical status response to 

treatment daily basis.  Encourage participation in therapeutic groups and 

activities.











                                        


                                    Allergies











Allergy/AdvReac Type Severity Reaction Status Date / Time


 


No Known Allergies Allergy   Verified 01/16/21 03:38








                                   Vital Signs











Temp  99.2 F   01/16/21 03:33


 


Pulse  90   01/16/21 03:33


 


Resp  15   01/16/21 03:33


 


BP  105/68   01/16/21 03:33


 


Pulse Ox  98   01/16/21 03:33








                                 Intake & Output











 01/15/21 01/16/21 01/16/21





 18:59 06:59 18:59


 


Weight  68.606 kg 








                             Laboratory Last Values











WBC  7.7 k/uL (3.8-10.6)   01/16/21  07:55    


 


RBC  5.11 m/uL (4.30-5.90)   01/16/21  07:55    


 


Hgb  16.0 gm/dL (13.0-17.5)   01/16/21  07:55    


 


Hct  46.2 % (39.0-53.0)   01/16/21  07:55    


 


MCV  90.5 fL (80.0-100.0)   01/16/21  07:55    


 


MCH  31.3 pg (25.0-35.0)   01/16/21  07:55    


 


MCHC  34.6 g/dL (31.0-37.0)   01/16/21  07:55    


 


RDW  11.8 % (11.5-15.5)   01/16/21  07:55    


 


Plt Count  334 k/uL (150-450)   01/16/21  07:55    


 


MPV  6.5   01/16/21  07:55    


 


Neutrophils %  62 %  01/16/21  07:55    


 


Lymphocytes %  29 %  01/16/21  07:55    


 


Monocytes %  4 %  01/16/21  07:55    


 


Eosinophils %  3 %  01/16/21  07:55    


 


Basophils %  1 %  01/16/21  07:55    


 


Neutrophils #  4.8 k/uL (1.3-7.7)   01/16/21  07:55    


 


Lymphocytes #  2.2 k/uL (1.0-4.8)   01/16/21  07:55    


 


Monocytes #  0.3 k/uL (0-1.0)   01/16/21  07:55    


 


Eosinophils #  0.2 k/uL (0-0.7)   01/16/21  07:55    


 


Basophils #  0.1 k/uL (0-0.2)   01/16/21  07:55    


 


Sodium  137 mmol/L (137-145)   01/16/21  07:55    


 


Potassium  4.9 mmol/L (3.5-5.1)   01/16/21  07:55    


 


Chloride  107 mmol/L ()   01/16/21  07:55    


 


Carbon Dioxide  24 mmol/L (22-30)   01/16/21  07:55    


 


Anion Gap  6 mmol/L  01/16/21  07:55    


 


BUN  14 mg/dL (9-20)   01/16/21  07:55    


 


Creatinine  0.99 mg/dL (0.66-1.25)   01/16/21  07:55    


 


Est GFR (CKD-EPI)AfAm  >90  (>60 ml/min/1.73 sqM)   01/16/21  07:55    


 


Est GFR (CKD-EPI)NonAf  89  (>60 ml/min/1.73 sqM)   01/16/21  07:55    


 


Glucose  111 mg/dL (74-99)  H  01/16/21  07:55    


 


Calcium  9.3 mg/dL (8.4-10.2)   01/16/21  07:55    


 


Total Bilirubin  0.5 mg/dL (0.2-1.3)   01/16/21  07:55    


 


Conjugated Bilirubin  0.0 mg/dL (0.0-0.3)   01/16/21  07:55    


 


Unconjugated Bilirubin  0.2 mg/dL (0.0-1.1)   01/16/21  07:55    


 


Delta Bilirubin  0.3 mg/dL (0.0-0.2)  H  01/16/21  07:55    


 


AST  31 U/L (17-59)   01/16/21  07:55    


 


ALT  26 U/L (4-49)   01/16/21  07:55    


 


Alkaline Phosphatase  97 U/L ()   01/16/21  07:55    


 


Total Protein  6.9 g/dL (6.3-8.2)   01/16/21  07:55    


 


Albumin  4.2 g/dL (3.5-5.0)   01/16/21  07:55    


 


Triglycerides  132 mg/dL (<150)   01/16/21  07:55    


 


Cholesterol  242 mg/dL (<200)  H  01/16/21  07:55    


 


LDL Cholesterol, Calc  153 mg/dL (0-99)  H  01/16/21  07:55    


 


HDL Cholesterol  63 mg/dL (40-60)  H  01/16/21  07:55    


 


TSH  0.853 mIU/L (0.465-4.680)   01/16/21  07:55    


 


Urine Color  Yellow   01/16/21  02:14    


 


Urine Appearance  Clear  (Clear)   01/16/21  02:14    


 


Urine pH  5.5  (5.0-8.0)   01/16/21  02:14    


 


Ur Specific Gravity  1.011  (1.001-1.035)   01/16/21  02:14    


 


Urine Protein  Negative  (Negative)   01/16/21  02:14    


 


Urine Glucose (UA)  Negative  (Negative)   01/16/21  02:14    


 


Urine Ketones  Negative  (Negative)   01/16/21  02:14    


 


Urine Blood  Negative  (Negative)   01/16/21  02:14    


 


Urine Nitrite  Negative  (Negative)   01/16/21  02:14    


 


Urine Bilirubin  Negative  (Negative)   01/16/21  02:14    


 


Urine Urobilinogen  <2.0 mg/dL (<2.0)   01/16/21  02:14    


 


Ur Leukocyte Esterase  Negative  (Negative)   01/16/21  02:14    


 


Urine Opiates Screen  Not Detected  (NotDetected)   01/16/21  02:25    


 


Ur Oxycodone Screen  Not Detected  (NotDetected)   01/16/21  02:25    


 


Urine Methadone Screen  Not Detected  (NotDetected)   01/16/21  02:25    


 


Ur Propoxyphene Screen  Not Detected  (NotDetected)   01/16/21  02:25    


 


Ur Barbiturates Screen  Not Detected  (NotDetected)   01/16/21  02:25    


 


U Tricyclic Antidepress  Not Detected  (NotDetected)   01/16/21  02:25    


 


Ur Phencyclidine Scrn  Not Detected  (NotDetected)   01/16/21  02:25    


 


Ur Amphetamines Screen  Detected  (NotDetected)  H  01/16/21  02:25    


 


U Methamphetamines Scrn  Detected  (NotDetected)  H  01/16/21  02:25    


 


U Benzodiazepines Scrn  Not Detected  (NotDetected)   01/16/21  02:25    


 


Urine Cocaine Screen  Not Detected  (NotDetected)   01/16/21  02:25    


 


U Marijuana (THC) Screen  Not Detected  (NotDetected)   01/16/21  02:25    


 


Coronavirus (PCR)  Not Detected  (Not Detectd)   01/16/21  02:26    











01/16/21 09:43

## 2021-01-17 RX ADMIN — LOPERAMIDE HYDROCHLORIDE PRN MG: 2 CAPSULE ORAL at 11:32

## 2021-01-17 RX ADMIN — NICOTINE SCH: 14 PATCH, EXTENDED RELEASE TRANSDERMAL at 09:03

## 2021-01-17 RX ADMIN — Medication SCH MG: at 09:00

## 2021-01-17 RX ADMIN — FOLIC ACID SCH MG: 1 TABLET ORAL at 09:00

## 2021-01-17 RX ADMIN — NICOTINE SCH PATCH: 14 PATCH, EXTENDED RELEASE TRANSDERMAL at 09:00

## 2021-01-17 NOTE — P.PN
Progress Note - Text


Progress Note Date: 01/17/21


Clinical Problems: Alcohol withdrawal, alcohol use disorder severe, bipolar 

illness with recent episode hypomanic, legal problems





Interim history:  I reviewed the medical record and interviewed the patient.  He

complained of continued alcohol withdrawal symptoms that I received with Librium

and Ativan.  His CIWA scores ranged from 0-10 over the last 24 hours and in 

addition to the 25 mg of Librium 3 times a day his received 2 mg of Ativan.  He 

reported continued feelings of hopelessness and helplessness over his legal 

problems and remains occupied on the possibility of incarceration.





He has not attended therapeutic groups and activities.  He slept 7 hours last 

night.  He spends his time in bed coming out for meals her medications.





Mental status exam:  He presented as a casually groomed 41  male who is

laying In bed.  He made eye contact and attended to the interview.  He was alert

and oriented to person, place and time.  He showed no abnormality of psychomotor

activity.  He was not diaphoretic or tremulous.  His speech was spontaneous with

decreased rate and rhythm.  His affect was depressed and not reactive.  He gave 

ambivalent answers to suicidal ideation and death wishes.  He ruminated about 

his legal problems.Ideas reference, paranoid ideation or delusions.  His 

thinking was concrete and associations were coherent, logical and goal directed.

 He denied hallucinations and did not appear to responding to internal stimuli.





Assessment:  He is having minimal to moderate alcohol withdrawal symptoms from 

complicated by delirium or hallucinations.  He continues to have suicidal 

thoughts.





Plan: Continue inpatient treatment.  Safety precautions.  Continue Librium 25 mg

3 times a day as well as Ativan 1 mg every 4 hours when necessary for a CIWA 

score of 10 or greater.  Review the need for antidepressants and/or mood 

stabilizer once the alcohol withdrawal subsides.  Encourage participation in 

therapeutic groups and activities.  Evaluate clinical status response to 

treatment daily basis.

## 2021-01-18 RX ADMIN — ESCITALOPRAM OXALATE SCH MG: 10 TABLET, FILM COATED ORAL at 21:16

## 2021-01-18 RX ADMIN — FOLIC ACID SCH MG: 1 TABLET ORAL at 08:13

## 2021-01-18 RX ADMIN — NICOTINE SCH: 14 PATCH, EXTENDED RELEASE TRANSDERMAL at 08:12

## 2021-01-18 RX ADMIN — Medication SCH MG: at 08:13

## 2021-01-18 NOTE — P.PN
Progress Note - Text


Progress Note Date: 01/18/21





Interval History:


Patient was seen resting in bed and was directable and agreeable to speak with 

writer in the office.  Patient reports that he is feeling slightly better but 

had difficulty sleeping last night due to his roommates.  He expresses that he 

feels like he is not really bipolar but just a very anxious individual.  He is 

currently not reporting any suicidal or homicidal ideation, intention, and/or 

plan.  He is not reporting any auditory or visual hallucinations.  He does 

continue to express excessive racing and worried thoughts.  He has been managed 

for alcohol withdrawal over the weekend.  Currently, the patient does express 

that he did well on Lexapro in the past.  He is also tried Zyprexa in the past 

which has helped him with his racing thoughts.  He is agreeable to start a trial

of these medications.  Patient expresses that he has a court appearance this 

week and that he is likely going to senior living.  He does express a diseases desire to 

go to senior living so that he may take time to focus on himself.





Mental Status Exam:


General Appearance: Patient appears to be stated age is alert, directable, and 

cooperative.  Patient has sow hair, is of normal build.  Fair hygiene and 

grooming.


Behavior: Patient is calmly seated without any agitated behavior.


Speech: Patient's speech is fluent and nonpressured. 


Mood/Affect: Mood is improving mildly, affect is congruent and constricted. 


Suicidality/Homicidality:  Patient is not reporting any suicidal or homicidal 

ideation, intention, and/or plan.


Perceptions: Patient denies any auditory or visualizations.


Though content/process: There is no evidence of any delusional thought content 

and thought process is linear and goal-directed. 


Memory and concentration: AOX3, grossly intact for the purposes of this session


Judgment and insight: Improving mildly





Assessment


Bipolar disorder, type II, recent episode hypomanic


Alcohol use disorder


Methamphetamine use disorder


Nicotine dependence





Plan:


-Patient continues to meet criteria for inpatient psychiatric admission for 

symptom stabilization and safety. Patient has signed adult voluntary form and 

medication consent and was placed in patient's chart.


-Medications:


We will begin taper for alcohol withdrawal.  We will decrease Librium to 25 mg 

by mouth twice a day.


We will start Lexapro 10 mg by mouth at bedtime for depression/anxiety


We will start Zyprexa 5 mg by mouth at bedtime for management of bipolar 

depression


-When necessary Ativan and Haldol for agitation/aggression.


-NRT - nicotine patch


-SW on board for discharge planning.  Encouraged the patient to participate in 

milieu.

## 2021-01-19 RX ADMIN — Medication SCH MG: at 08:53

## 2021-01-19 RX ADMIN — NICOTINE SCH: 14 PATCH, EXTENDED RELEASE TRANSDERMAL at 08:54

## 2021-01-19 RX ADMIN — FOLIC ACID SCH MG: 1 TABLET ORAL at 08:53

## 2021-01-19 RX ADMIN — ESCITALOPRAM OXALATE SCH MG: 10 TABLET, FILM COATED ORAL at 20:07

## 2021-01-19 RX ADMIN — NICOTINE SCH PATCH: 14 PATCH, EXTENDED RELEASE TRANSDERMAL at 14:41

## 2021-01-19 NOTE — P.PN
Progress Note - Text


Progress Note Date: 01/19/21





Interval History:


Patient was seen resting in bed and was directable and agreeable to speak with 

writer in the office.  Patient reports that he is feeling slightly better today.

 He claims that he has improved significantly with regards to his alcohol 

withdrawal symptoms.  He is claiming that his anxiety and mood have been 

gradually improving.  He states that he is feeling more drowsy this morning 

after taking the Librium and asked for it to be reduced even further.  He claims

that he has not been going to any groups as he feels that they are not helpful 

for him.  He is currently not reporting any suicidal or homicidal ideation, 

intention, and/or plan.  He is not reporting any auditory or visual 

hallucinations.  He does continue to express excessive racing and worried 

thoughts.  He states that he was able to sleep better last night with the 

Zyprexa. 





Mental Status Exam:


General Appearance: Patient appears to be stated age is alert, directable, and 

cooperative.  Patient has sow hair, is of normal build.  Fair hygiene and 

grooming.


Behavior: Patient is calmly seated without any agitated behavior.


Speech: Patient's speech is fluent and nonpressured. 


Mood/Affect: Mood is improving mildly, affect is congruent and constricted. 


Suicidality/Homicidality:  Patient is not reporting any suicidal or homicidal 

ideation, intention, and/or plan.


Perceptions: Patient denies any auditory or visualizations.


Though content/process: There is no evidence of any delusional thought content 

and thought process is linear and goal-directed. 


Memory and concentration: AOX3, grossly intact for the purposes of this session


Judgment and insight: Poor, Improving mildly





Assessment


Bipolar disorder, type II, recent episode hypomanic


Alcohol use disorder


Methamphetamine use disorder


Nicotine dependence





Plan:


-Patient continues to meet criteria for inpatient psychiatric admission for 

symptom stabilization and safety. Patient has signed adult voluntary form and 

medication consent and was placed in patient's chart.


-Medications: Will decrease Librium to 25 mg by mouth nightly, continue with 

Lexapro 10 mg by mouth at bedtime for depression/anxiety, continue with Zyprexa 

5 mg by mouth at bedtime for management of bipolar depression.


-When necessary Ativan and Haldol for agitation/aggression.


-NRT - nicotine patch


-SW on board for discharge planning.  Encouraged the patient to participate in 

milieu.

## 2021-01-20 RX ADMIN — NICOTINE SCH PATCH: 14 PATCH, EXTENDED RELEASE TRANSDERMAL at 08:24

## 2021-01-20 RX ADMIN — Medication SCH MG: at 08:24

## 2021-01-20 RX ADMIN — FOLIC ACID SCH MG: 1 TABLET ORAL at 08:24

## 2021-01-20 NOTE — P.PN
Progress Note - Text


Progress Note Date: 01/20/21





Interval History:


Patient was seen resting in bed and was directable and agreeable to speak with 

writer in the office.  Currently the patient's primary concern is his elevated 

anxiety and racing thoughts which she attributes to his ongoing psychosocial 

stressors.  He states that his court has been adjourned as the  felt it was

inappropriate to have this year and while he is currently being treated for 

mental health and inpatient psychiatric setting.  The patient is not endorsing 

any suicidal or homicidal ideation, intention, and/or plan.  He is not reporting

any auditory or visual hallucinations.  He is been adherent with his medications

and is not reporting any significant side effects at this time.  The patient 

states that he would likely be going to his parents home upon discharge.  He 

states that he will sign a release of information for her treatment team.





Mental Status Exam:


General Appearance: Patient appears to be stated age is alert, directable, and 

cooperative.  Patient has sow hair, is of normal build.  Fair hygiene and 

grooming.


Behavior: Patient is calmly seated without any agitated behavior.


Speech: Patient's speech is fluent and nonpressured. 


Mood/Affect: Mood is improving mildly, affect is congruent and constricted. 


Suicidality/Homicidality:  Patient is not reporting any suicidal or homicidal 

ideation, intention, and/or plan.


Perceptions: Patient denies any auditory or visualizations.


Though content/process: There is no evidence of any delusional thought content 

and thought process is linear and goal-directed. 


Memory and concentration: AOX3, grossly intact for the purposes of this session


Judgment and insight: Improving mildly





Assessment


Bipolar disorder, type II, recent episode hypomanic


Alcohol use disorder


Methamphetamine use disorder


Nicotine dependence





Plan:


-Patient continues to meet criteria for inpatient psychiatric admission for 

symptom stabilization and safety. Patient has signed adult voluntary form and 

medication consent and was placed in patient's chart.


-Medications: 


Increase Lexapro to 20 mg by mouth at bedtime for depression/anxiety


Increase Vistaril to 50 mg by mouth 3 times a day when necessary for anxiety


Increase Zyprexa to 10 mg by mouth at bedtime for bipolar depression


-When necessary Ativan and Haldol for agitation/aggression.


-NRT - nicotine patch


-SW on board for discharge planning.  Encouraged the patient to participate in 

milieu.

## 2021-01-21 VITALS — HEART RATE: 111 BPM | DIASTOLIC BLOOD PRESSURE: 80 MMHG | SYSTOLIC BLOOD PRESSURE: 108 MMHG

## 2021-01-21 VITALS — RESPIRATION RATE: 18 BRPM | TEMPERATURE: 97.7 F

## 2021-01-21 RX ADMIN — Medication SCH MG: at 08:31

## 2021-01-21 RX ADMIN — NICOTINE SCH PATCH: 14 PATCH, EXTENDED RELEASE TRANSDERMAL at 08:31

## 2021-01-21 RX ADMIN — FOLIC ACID SCH MG: 1 TABLET ORAL at 08:31

## 2021-01-21 NOTE — P.DS
Providers


Date of admission: 


01/16/21 02:22





Expected date of discharge: 01/21/21


Attending physician: 


Gilbert Harmon MD





Consults: 





                                        





01/16/21 03:17


Consult Physician Routine 


   Consulting Provider: [facility]


   Consult Reason/Comments: For H & P for Medical Follow Up


   Do you want consulting provider notified?: Yes











Primary care physician: 


Stated None








- Discharge Diagnosis(es)


(1) Bipolar 2 disorder


Current Visit: Yes   Status: Acute   Priority: High   





(2) Alcohol use disorder, moderate, dependence


Current Visit: Yes   Status: Acute   Priority: Medium   





(3) Methamphetamine use


Current Visit: Yes   Status: Chronic   Priority: Medium   





(4) Nicotine dependence


Current Visit: No   Status: Chronic   Priority: Medium   


Hospital Course: 





Admission HPI:


Initial psychiatric evaluation was completed by Dr. Thomas on 01/16/2021 who 

wrote:


"He is a 50-year-old single  male admitted psychiatric unit 

voluntarily.





HISTORY OF PRESENT ILLNESS:  He presented to the emergency center voluntarily 

with complaints of suicidal ideation and a plan to drink himself to death.  He 

stated that he is been distressed and overwhelmed because he anticipates going 

shelter.  He perseverated on not being able to cope with  incarcerated for an 

extended period of time.  He talked about how he would rather die or as he put 

it "put a bullet through my head" before he would go to shelter.  Apparently he 

received his third driving while intoxicated citation this weeks; his 3rd in the

last 3 months.  He was incarcerated on Monday prior to this admission and 

released on bond on "Tuesday or Wednesday."  He has a hearing scheduled for 

01/19/2021 and fully anticipates that she would be incarcerated.





He talked about drinking daily to intoxication usually beer but sometimes whis

lance.  He was evasive about the amount but suggested that when he drank beer 

would be at least "case" or a "fifth" whiskey.  He denied recent use of other 

drugs to get high, help him sleep or changes mood.  However, his UDS was 

positive for amphetamine and methamphetamine.  His breath alcohol level on 

presentation before meals was 0.061.





He described a long  history of alcohol and drug use problems beginning in late 

adolescent.  He received his first driving while intoxicated citation when he 

was "17 or 18" years old.  He controlled his alcohol and drug use "for many 

years" until he relapsed when he was approximately 46 years old while he was 

living in [place].  He attributed the relapse to undiagnosed psychiatric 

illness and perseverated about having diagnosis of ADHD, anxiety disorder, 

depression and bipolar illness.  He talked about feeling "on top of the world" 

for many years and when he turned 46 "crashing".  She discovered that 

methamphetamine created the same sense of euphoria that the experience before 

the abrupt change in his mood.  He has been using methamphetamine since.  As a 

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





Hospital course:


Upon admission to the unit patient was initially appearing distressed and 

endorsed suicidal ideation and death wishes.  He was also convinced that he was 

likely to be incarcerated.  He was initially preservative on his inability to 

cope with the extended incarceration.  The patient was initially started on 

Librium and placed on CIWA protocl for alcohol withdrawal.  When evaluated later

during the course of the hospitalization, the patient was started on Lexapro and

Zyprexa for management of bipolar depression.  Over the course of the 

hospitalization, the patient displayed improvements in coping skills, mood 

lability, and decreased suicidal ideation.  The patient would occasionally 

present as irritable but was always redirectable.  He remained primarily 

athletic to himself in his room stating that he preferred to meditate to deal 

with his anxiety rather than engage in milieu activities.  The patient's court 

was adjourned as the  felt it was inappropriate for him to have his hearing

while being treated for mental health and inpatient psychiatric unit.  Gradually

the patient continued to display an improvement.  On day of discharge, the 

patient is not endorsing any suicidal or homicidal ideation, intention, and/or 

plan.  He is not reporting any access to firearms or other weapons.  He is 

reporting no auditory or visual hallucinations.  He is denying any paranoia or 

delusions.  He has been in adherent with his medications and appears to be 

tolerating them well and is not endorsing any significant side effects.  The 

patient does have a significant history of substance abuse, however, was 

counseled on abstaining from all substances including alcohol, marijuana, and 

methamphetamines.  The patient was offered however declined inpatient substance-

abuse rehab.  Patient was counseled on the medications and need for regular 

compliance.  Patient was uncertain of where he would be discharged to, but sta

dex that he would likely stay with his mother.





Mental status exam:


General Appearance: Patient appears to be stated age is alert, pleasant, and 

cooperative. Patient is in no acute distress and has fair hygiene and grooming 


Behavior: Patient is calmly seated without any agitated behavior.


Speech: Patient's speech is fluent and nonpressured. 


Mood/Affect: Patient reports their mood is "much better", affect is congruent 

and euthymic. 


Suicidality/Homicidality:  Patient denies having any suicidal or homicidal 

ideation intent or plan.  


Perceptions: Patient denies any auditory or visual hallucinations.  


Though content/process: There is no evidence of any delusional thought content 

and thought process is linear and goal-directed. 


Memory and concentration: AOX3, grossly intact for the purposes of this session.

Can spell "WORLD" backwards correctly.


Judgment and insight: Improved with guarded prognosis





Impression:


Bipolar disorder, type II, recent episode hypomanic


Alcohol use disorder


Methamphetamine use disorder


Nicotine dependence





Plan:


-Continue with discharge today as patient has improved and stabilized 

psychiatrically and is not currently an imminent threat to himself and/or ot

hers. Patient will remain at chronically elevated risk for harm to self and/or 

others due to his impulsivity and polysubstance abuse.


-Continue medications: 


Lexapro 20 mg by mouth at bedtime for depression/anxiety 


Hydroxyzine 50 mg 3 times a day when necessary for anxiety


Zyprexa 10 mg by mouth at bedtime for mood stability/bipolar depression


Nicotine patches for nicotine dependence 


-Patient was counseled on the need for medication compliance and appropriate 

follow-up at mental health and also primary care for medical issues.  Patient 

verbalized understanding and agreed.


-Social work to arrange for and conduct family meeting to ensure safety upon 

discharge and answer any questions/concerns. Social work also to arrange for 

patients follow up appointments with [facility] for psychiatric care along with follow 

up with primary care provider.


-Patient counseled on abstaining from recreational drugs and marijuana and 

alcohol. Was informed/educated on the adverse effects on their physical and men

eliot health. Patient verbally agreed and understood. Patient was offered 

substance abuse treatment however declined at this time.


-Patient was instructed to return to the hospital or seek immediate medical care

if their psychiatric or medical symptoms do worsen or reoccur.


-Psychoeducation and supportive therapy provided to patient.  Risks and benefits

of pharmacological treatment versus the risks and benefits of nontreatment 

weight and discussed.  Informed consent discussion held.  Common side effects of

psychotropics discussed such as, but not limited to headache, GI disturbance, 

sexual dysfunction, movement disorders, sedation, and orthostatic hypotension.  

Life threatening and blackbox warnings of prescribed medications also discussed.

 Potential risks of operating a vehicle or heavy machinery discussed with 

patient at length.  Advised on importance of compliance and a reliable and 

responsible manner. Patient advised to review FDA consumer labeling of all 

medications prior to taking.  Patient verbalized understanding of potential 

risks, and agrees with current treatment plan.  Patient advised to medically 

contact physician/emergency personnel if any acute changes in condition occur.





                                   Vital Signs











Temp  97.7 F   01/21/21 06:14


 


Pulse  111 H  01/21/21 08:36


 


Resp  18   01/21/21 06:14


 


BP  108/80   01/21/21 08:36


 


Pulse Ox  98   01/16/21 03:33














                               Laboratory Results











WBC  7.7 k/uL (3.8-10.6)   01/16/21  07:55    


 


RBC  5.11 m/uL (4.30-5.90)   01/16/21  07:55    


 


Hgb  16.0 gm/dL (13.0-17.5)   01/16/21  07:55    


 


Hct  46.2 % (39.0-53.0)   01/16/21  07:55    


 


MCV  90.5 fL (80.0-100.0)   01/16/21  07:55    


 


MCH  31.3 pg (25.0-35.0)   01/16/21  07:55    


 


MCHC  34.6 g/dL (31.0-37.0)   01/16/21  07:55    


 


RDW  11.8 % (11.5-15.5)   01/16/21  07:55    


 


Plt Count  334 k/uL (150-450)   01/16/21  07:55    


 


MPV  6.5   01/16/21  07:55    


 


Neutrophils %  62 %  01/16/21  07:55    


 


Lymphocytes %  29 %  01/16/21  07:55    


 


Monocytes %  4 %  01/16/21  07:55    


 


Eosinophils %  3 %  01/16/21  07:55    


 


Basophils %  1 %  01/16/21  07:55    


 


Neutrophils #  4.8 k/uL (1.3-7.7)   01/16/21  07:55    


 


Lymphocytes #  2.2 k/uL (1.0-4.8)   01/16/21  07:55    


 


Monocytes #  0.3 k/uL (0-1.0)   01/16/21  07:55    


 


Eosinophils #  0.2 k/uL (0-0.7)   01/16/21  07:55    


 


Basophils #  0.1 k/uL (0-0.2)   01/16/21  07:55    


 


Sodium  137 mmol/L (137-145)   01/16/21  07:55    


 


Potassium  4.9 mmol/L (3.5-5.1)   01/16/21  07:55    


 


Chloride  107 mmol/L ()   01/16/21  07:55    


 


Carbon Dioxide  24 mmol/L (22-30)   01/16/21  07:55    


 


Anion Gap  6 mmol/L  01/16/21  07:55    


 


BUN  14 mg/dL (9-20)   01/16/21  07:55    


 


Creatinine  0.99 mg/dL (0.66-1.25)   01/16/21  07:55    


 


Est GFR (CKD-EPI)AfAm  >90  (>60 ml/min/1.73 sqM)   01/16/21  07:55    


 


Est GFR (CKD-EPI)NonAf  89  (>60 ml/min/1.73 sqM)   01/16/21  07:55    


 


Glucose  111 mg/dL (74-99)  H  01/16/21  07:55    


 


Estimated Ave Glu mg/dL  111   01/16/21  07:55    


 


Hemoglobin A1c  5.5 % (4.0-6.0)   01/16/21  07:55    


 


Calcium  9.3 mg/dL (8.4-10.2)   01/16/21  07:55    


 


Total Bilirubin  0.5 mg/dL (0.2-1.3)   01/16/21  07:55    


 


Conjugated Bilirubin  0.0 mg/dL (0.0-0.3)   01/16/21  07:55    


 


Unconjugated Bilirubin  0.2 mg/dL (0.0-1.1)   01/16/21  07:55    


 


Delta Bilirubin  0.3 mg/dL (0.0-0.2)  H  01/16/21  07:55    


 


AST  31 U/L (17-59)   01/16/21  07:55    


 


ALT  26 U/L (4-49)   01/16/21  07:55    


 


Alkaline Phosphatase  97 U/L ()   01/16/21  07:55    


 


Total Protein  6.9 g/dL (6.3-8.2)   01/16/21  07:55    


 


Albumin  4.2 g/dL (3.5-5.0)   01/16/21  07:55    


 


Triglycerides  132 mg/dL (<150)   01/16/21  07:55    


 


Cholesterol  242 mg/dL (<200)  H  01/16/21  07:55    


 


LDL Cholesterol, Calc  153 mg/dL (0-99)  H  01/16/21  07:55    


 


HDL Cholesterol  63 mg/dL (40-60)  H  01/16/21  07:55    


 


TSH  0.853 mIU/L (0.465-4.680)   01/16/21  07:55    


 


Urine Color  Yellow   01/16/21  02:14    


 


Urine Appearance  Clear  (Clear)   01/16/21  02:14    


 


Urine pH  5.5  (5.0-8.0)   01/16/21  02:14    


 


Ur Specific Gravity  1.011  (1.001-1.035)   01/16/21  02:14    


 


Urine Protein  Negative  (Negative)   01/16/21  02:14    


 


Urine Glucose (UA)  Negative  (Negative)   01/16/21  02:14    


 


Urine Ketones  Negative  (Negative)   01/16/21  02:14    


 


Urine Blood  Negative  (Negative)   01/16/21  02:14    


 


Urine Nitrite  Negative  (Negative)   01/16/21  02:14    


 


Urine Bilirubin  Negative  (Negative)   01/16/21  02:14    


 


Urine Urobilinogen  <2.0 mg/dL (<2.0)   01/16/21  02:14    


 


Ur Leukocyte Esterase  Negative  (Negative)   01/16/21  02:14    


 


Urine Opiates Screen  Not Detected  (NotDetected)   01/16/21  02:25    


 


Ur Oxycodone Screen  Not Detected  (NotDetected)   01/16/21  02:25    


 


Urine Methadone Screen  Not Detected  (NotDetected)   01/16/21  02:25    


 


Ur Propoxyphene Screen  Not Detected  (NotDetected)   01/16/21  02:25    


 


Ur Barbiturates Screen  Not Detected  (NotDetected)   01/16/21  02:25    


 


U Tricyclic Antidepress  Not Detected  (NotDetected)   01/16/21  02:25    


 


Ur Phencyclidine Scrn  Not Detected  (NotDetected)   01/16/21  02:25    


 


Ur Amphetamines Screen  Detected  (NotDetected)  H  01/16/21  02:25    


 


U Methamphetamines Scrn  Detected  (NotDetected)  H  01/16/21  02:25    


 


U Benzodiazepines Scrn  Not Detected  (NotDetected)   01/16/21  02:25    


 


Urine Cocaine Screen  Not Detected  (NotDetected)   01/16/21  02:25    


 


U Marijuana (THC) Screen  Not Detected  (NotDetected)   01/16/21  02:25    


 


Coronavirus (PCR)  Not Detected  (Not Detectd)   01/16/21  02:26    

















                                    Allergies











Allergy/AdvReac Type Severity Reaction Status Date / Time


 


No Known Allergies Allergy   Verified 01/16/21 03:38











Patient Condition at Discharge: Stable





Plan - Discharge Summary


Discharge Rx Participant: Yes


New Discharge Prescriptions: 


New


   Folic Acid 1 mg PO DAILY 30 Days  tab


   Nicotine 14Mg/24Hr Patch [Habitrol] 1 patch TRANSDERM DAILY 30 Days  patch


   Escitalopram [Lexapro] 20 mg PO HS 30 Days  tab


   hydrOXYzine pamoate [Vistaril] 50 mg PO TID PRN 30 Days  cap


     PRN Reason: Anxiety


   Thiamine [Vitamin B-1] 100 mg PO DAILY 30 Days  tab


   OLANZapine [ZyPREXA] 10 mg PO HS 30 Days  tab





Discontinued


   Folic Acid 1 mg PO DAILY 30 Days  tab


   Multivitamins, Thera [Multivitamin (formulary)] 1 each PO DAILY 30 Days  tab


   Thiamine [Vitamin B-1] 100 mg PO DAILY 30 Days  tab


   traZODone HCL [Desyrel] 100 mg PO HS 14 Days  tab


   Escitalopram [Lexapro] 10 mg PO DAILY 14 Days  tab


   OLANZapine [ZyPREXA] 5 mg PO TID 14 Days  tab


Discharge Medication List





Escitalopram [Lexapro] 20 mg PO HS 30 Days  tab 01/21/21 [Rx]


Folic Acid 1 mg PO DAILY 30 Days  tab 01/21/21 [Rx]


Nicotine 14Mg/24Hr Patch [Habitrol] 1 patch TRANSDERM DAILY 30 Days  patch 01/ 21/21 [Rx]


OLANZapine [ZyPREXA] 10 mg PO HS 30 Days  tab 01/21/21 [Rx]


Thiamine [Vitamin B-1] 100 mg PO DAILY 30 Days  tab 01/21/21 [Rx]


hydrOXYzine pamoate [Vistaril] 50 mg PO TID PRN 30 Days  cap 01/21/21 [Rx]








Follow up Appointment(s)/Referral(s): 


[facility] [place] [Other] - 1 Week


(Zoom information [phone] dial in


01/26/2021 @ 13:30


)


[facility] [Outside] - 01/22/21 3:00 pm


(1-22-21 @ 3:00 with Shannan Nieto in the [place] office





1-28-21 @ 2:00 with BARRETT LOPEZ in the [place] Office (future appts with Sara LOPEZ 

will be in the [place] office))


[facility]


Activity/Diet/Wound Care/Special Instructions: 


Activity and diet as tolerated. Avoid the use of street drugs and alcohol. Take 

all medications as prescribed. When you are in need of refills on your 

medications please contact your medical provider and/or outpatient psychiatrist 

to have this done. Please go to scheduled outpatient appointment for aftercare 

treatment. If symptoms return or become worse, call the crisis line at 

[phone] and/or go to the nearest emergency room for evaluation.

## 2023-05-12 ENCOUNTER — HOSPITAL ENCOUNTER (EMERGENCY)
Dept: HOSPITAL 47 - EC | Age: 53
Discharge: HOME | End: 2023-05-12
Payer: COMMERCIAL

## 2023-05-12 VITALS — SYSTOLIC BLOOD PRESSURE: 135 MMHG | DIASTOLIC BLOOD PRESSURE: 78 MMHG | TEMPERATURE: 98.7 F

## 2023-05-12 VITALS — HEART RATE: 75 BPM | RESPIRATION RATE: 16 BRPM

## 2023-05-12 DIAGNOSIS — Z87.891: ICD-10-CM

## 2023-05-12 DIAGNOSIS — Z20.822: ICD-10-CM

## 2023-05-12 DIAGNOSIS — N39.0: ICD-10-CM

## 2023-05-12 DIAGNOSIS — N45.3: Primary | ICD-10-CM

## 2023-05-12 LAB
PH UR: 5.5 [PH] (ref 5–8)
SP GR UR: 1 (ref 1–1.03)
UROBILINOGEN UR QL STRIP: <2 MG/DL (ref ?–2)

## 2023-05-12 PROCEDURE — 93975 VASCULAR STUDY: CPT

## 2023-05-12 PROCEDURE — 87636 SARSCOV2 & INF A&B AMP PRB: CPT

## 2023-05-12 PROCEDURE — 99285 EMERGENCY DEPT VISIT HI MDM: CPT

## 2023-05-12 PROCEDURE — 76870 US EXAM SCROTUM: CPT

## 2023-05-12 PROCEDURE — 81003 URINALYSIS AUTO W/O SCOPE: CPT

## 2023-05-12 NOTE — ED
Male Urogenital HPI





- General


Chief complaint: Urogenital


Stated complaint: Testicle Pain


Time Seen by Provider: 05/12/23 15:33


Source: patient, RN notes reviewed, old records reviewed


Mode of arrival: ambulatory


Limitations: no limitations





- History of Present Illness


Initial comments: 





This is a 53-year-old male to the emergency department for evaluation patient 

presents today for severe groin pain right pain and swelling with dysuria and 

increased urination.  No abdominal pain generalized body aches and pains and 

noticed fever.  Patient went to urgent care prior to arrival and presents from 

urgent care


MD Complaint: testicle pain, testicle swelling, other (Fever)


-: days(s)


Location: penis, left testicle


Radiation: none


Severity: moderate


Severity scale (1-10): 7


Quality: burning, sharp


Consistency: constant


Improves with: none


Worsens with: urination


Reports: fever





- Related Data


                                  Previous Rx's











 Medication  Instructions  Recorded


 


Escitalopram [Lexapro] 20 mg PO HS 30 Days  tab 01/21/21


 


Folic Acid 1 mg PO DAILY 30 Days  tab 01/21/21


 


Nicotine 14Mg/24Hr Patch [Habitrol] 1 patch TRANSDERM DAILY 30 Days 01/21/21





 patch 


 


OLANZapine [ZyPREXA] 10 mg PO HS 30 Days  tab 01/21/21


 


Thiamine [Vitamin B-1] 100 mg PO DAILY 30 Days  tab 01/21/21


 


hydrOXYzine pamoate [Vistaril] 50 mg PO TID PRN 30 Days  cap 01/21/21


 


Ciprofloxacin HCl [Cipro] 500 mg PO Q12HR #28 tablet 05/12/23











                                    Allergies











Allergy/AdvReac Type Severity Reaction Status Date / Time


 


No Known Allergies Allergy   Verified 05/12/23 15:20














Review of Systems


ROS Statement: 


Those systems with pertinent positive or pertinent negative responses have been 

documented in the HPI.





ROS Other: All systems not noted in ROS Statement are negative.





Past Medical History


Past Medical History: No Reported History


Additional Past Medical History / Comment(s): depression , former ETOH, previous

 heroin use


History of Any Multi-Drug Resistant Organisms: None Reported


Past Surgical History: No Surgical Hx Reported


Past Anesthesia/Blood Transfusion Reactions: No Reported Reaction


Past Psychological History: Anxiety, Depression


Smoking Status: Former smoker


Past Alcohol Use History: None Reported


Past Drug Use History: None Reported





- Past Family History


  ** Father


Family Medical History: Chest Pain / Angina





General Exam


Limitations: no limitations


General appearance: alert, in no apparent distress


Head exam: Present: atraumatic, normocephalic, normal inspection


Eye exam: Present: normal appearance, PERRL, EOMI.  Absent: scleral icterus, 

conjunctival injection, periorbital swelling


ENT exam: Present: normal exam, mucous membranes moist


Neck exam: Present: normal inspection.  Absent: tenderness, meningismus, 

lymphadenopathy


Respiratory exam: Present: normal lung sounds bilaterally.  Absent: respiratory 

distress, wheezes, rales, rhonchi, stridor


Cardiovascular Exam: Present: regular rate, normal rhythm, normal heart sounds. 

 Absent: systolic murmur, diastolic murmur, rubs, gallop, clicks


GI/Abdominal exam: Present: soft, normal bowel sounds.  Absent: distended, 

tenderness, guarding, rebound, rigid


Extremities exam: Present: normal inspection, full ROM, normal capillary refill.

  Absent: tenderness, pedal edema, joint swelling, calf tenderness


Back exam: Present: normal inspection


Neurological exam: Present: alert, oriented X3, CN II-XII intact


Psychiatric exam: Present: normal affect, normal mood


Skin exam: Present: warm, dry, intact, normal color.  Absent: rash





Course


                                   Vital Signs











  05/12/23 05/12/23





  15:20 17:13


 


Temperature 100.5 F H 98.7 F


 


Pulse Rate 75 75


 


Respiratory 16 16





Rate  


 


Blood Pressure 147/91 135/78


 


O2 Sat by Pulse 98 98





Oximetry  














- Reevaluation(s)


Reevaluation #1: 





05/12/23 23:16


Medical records reviewed


Reevaluation #2: 





05/12/23 23:16


Symptoms improved here in the ER


Reevaluation #3: 





05/12/23 23:16


Patient informed results questions answered


Reevaluation #4: 





05/12/23 23:16


Was pt. sent in by a medical professional or institution?


@  -no


Did you speak to anyone other than the patient for history?  


@  -no


Did you review nursing and triage notes? 


@  -agree


Were old charts reviewed? 


@  -no


Differential Diagnosis? 


@  -prior


EKG interpreted by me (3pts min.)?


@  -yes


X-rays interpreted by me (1pt min.)?


@  -yes


CT interpreted by me (1pt min.)?


@  -no


U/S interpreted by me (1pt. min.)?


@  -no


What testing was considered but not performed? (CT, X-rays, U/S, labs)? Why?


@  -no


What meds were considered but not given? Why?


@  -no


Did you discuss the management of the patient with other professionals?


@  -no


Did you reconcile home meds?


@  -no


Was smoking cessation discussed for >3mins.?


@  -no


Was critical care preformed (if so, how long)?


@  -no


Were there social determinants of health that impacted care today? How? 

(Homelessness, low income, unemployed, alcoholism, drug addiction, 

transportation, low edu. Level, literacy, decrease access to med. care, [facility], 

rehab)?


@  -no


Was there de-escalation of care discussed even if they declined? (Discuss DNR or

 withdrawal of care, Hospice)?


@  -no


What co-morbidities impacted this encounter? (DM, HTN, Smoking, COPD, CAD, 

Cancer, CVA, Hep., AIDS, mental health diagnosis, sleep apnea, morbid obesity)?


@  -none


Was patient admitted / discharged?


@  -dc


Undiagnosed new problem with uncertain prognosis?


@  -no


Drug Therapy requiring intensive monitoring for toxicity (Heparin, Nitro, 

Insulin, Cardizem)?


@  -no


Were any procedures done?


@  -no


Diagnosis/symptom?


@  -Orchitis,UTI


Acute, or Chronic, or Acute on Chronic?


@  -acute


Uncomplicated (without systemic symptoms) or Complicated (systemic symptoms)?


@  -uncomplicated


Side effects of treatment?


@  -no


Exacerbation, Progression, or Severe Exacerbation]


@  -no


Poses a threat to life or bodily function?


@  -no








Reevaluation #5: 





05/12/23 23:16


Differential Fever:


Pneumonia, viral URI, endocarditis, myocarditis, pericarditis, otitis, sinusi

tis, peritonsillar Abscess, retropharyngeal Abscess, epiglottitis, peritonitis, 

appendicitis, Constance cystitis, diverticulitis, hepatitis, colitis, UTI, PID, TOA,

pyelonephritis, prostatitis, epididymitis, meningitis, encephalitis, pulmonary 

embolism, CVA, thyroid storm, pancreatitis, adrenal crisis, cavernous sinus 

thrombosis, this is not meant to be an all-inclusive list. 





Medical Decision Making





- Medical Decision Making





53 male DF for evaluation of groin pain scrotal pain and tenderness positive for

otitis patient placed on antibiotics and can be discharged home





- Lab Data


                                   Lab Results











  05/12/23 05/12/23 Range/Units





  15:47 17:06 


 


Urine Color  Colorless   


 


Urine Appearance  Clear   (Clear)  


 


Urine pH  5.5   (5.0-8.0)  


 


Ur Specific Gravity  1.004   (1.001-1.035)  


 


Urine Protein  Negative   (Negative)  


 


Urine Glucose (UA)  Trace H   (Negative)  


 


Urine Ketones  Negative   (Negative)  


 


Urine Blood  Negative   (Negative)  


 


Urine Nitrite  Negative   (Negative)  


 


Urine Bilirubin  Negative   (Negative)  


 


Urine Urobilinogen  <2.0   (<2.0)  mg/dL


 


Ur Leukocyte Esterase  Negative   (Negative)  


 


Influenza Type A (PCR)   Not Detected  (Not Detectd)  


 


Influenza Type B (PCR)   Not Detected  (Not Detectd)  


 


RSV (PCR)   Not Detected  (Not Detectd)  


 


SARS-CoV-2 (PCR)   Not Detected  (Not Detectd)  














Disposition


Clinical Impression: 


 UTI (urinary tract infection), Orchitis and epididymitis





Disposition: HOME SELF-CARE


Condition: Good


Instructions (If sedation given, give patient instructions):  Epididymo-Orchitis

(ED), Urinary Tract Infection in Men (ED), Testicle Pain (ED), Orchitis (ED)


Prescriptions: 


Ciprofloxacin HCl [Cipro] 500 mg PO Q12HR #28 tablet


Is patient prescribed a controlled substance at d/c from ED?: No


Referrals: 


Tai Ribeiro MD [STAFF PHYSICIAN] - 1-2 days

## 2023-05-12 NOTE — US
EXAMINATION TYPE: US scrotum with doppler.  

 

TECHNIQUE: Grayscale and color Doppler Duplex imaging performed of the scrotum.

 

DATE OF EXAM: 5/12/2023

 

COMPARISON: NONE

 

CLINICAL INDICATION: Male, 53 years old with history of pain/asymmetrical;

 

EXAM MEASUREMENTS:

 

TESTICLES:

Right Testicle:  4.4 x 2.4 x 3.2 cm

Left Testicle:  4.1 x 2.7 x 2.9 cm

 

Doppler performed to assess for testicular vascularity; good bilateral color flow and waveforms are s
een.   There is no evidence of testicular torsion.

 

EPIDIDYMIS HEAD:

Right Epididymis:  1.2 x 0.8 cm

Left Epididymis:  2.3 x 1.2 cm  

 

Enlarged left epididymis with increased vascular flow consistent with epididymitis.

 

 

Presence of hydroceles:  small amount of fluid around right testicle. Large fluid collection around l
eft testicle measuring 5.2 x 2.8 cm. 

 

 

 

 

 

 

IMPRESSION: 

1. Correlate for left-sided epididymitis with reactive left-sided hydrocele.

2. Trace hydrocele on the right. No sonographic evidence for testicular torsion.